# Patient Record
Sex: MALE | Race: WHITE | Employment: OTHER | URBAN - METROPOLITAN AREA
[De-identification: names, ages, dates, MRNs, and addresses within clinical notes are randomized per-mention and may not be internally consistent; named-entity substitution may affect disease eponyms.]

---

## 2017-02-06 ENCOUNTER — OFFICE VISIT (OUTPATIENT)
Dept: FAMILY MEDICINE CLINIC | Facility: CLINIC | Age: 62
End: 2017-02-06

## 2017-02-06 VITALS
HEIGHT: 70.5 IN | DIASTOLIC BLOOD PRESSURE: 68 MMHG | WEIGHT: 269 LBS | RESPIRATION RATE: 20 BRPM | HEART RATE: 68 BPM | SYSTOLIC BLOOD PRESSURE: 132 MMHG | BODY MASS INDEX: 38.08 KG/M2

## 2017-02-06 DIAGNOSIS — I27.20 PULMONARY HYPERTENSION (HCC): ICD-10-CM

## 2017-02-06 DIAGNOSIS — I77.810 DILATATION OF THORACIC AORTA (HCC): ICD-10-CM

## 2017-02-06 DIAGNOSIS — E11.9 TYPE 2 DIABETES MELLITUS WITHOUT COMPLICATION, WITHOUT LONG-TERM CURRENT USE OF INSULIN (HCC): Primary | ICD-10-CM

## 2017-02-06 DIAGNOSIS — I10 HYPERTENSION GOAL BP (BLOOD PRESSURE) < 130/80: ICD-10-CM

## 2017-02-06 DIAGNOSIS — E78.5 DYSLIPIDEMIA, GOAL LDL BELOW 100: ICD-10-CM

## 2017-02-06 PROCEDURE — 99214 OFFICE O/P EST MOD 30 MIN: CPT | Performed by: FAMILY MEDICINE

## 2017-02-06 NOTE — PROGRESS NOTES
HPI:   Gilmer Acevedo is a 62year old male who presents for recheck of his diabetes.     Patient presents with:  Diabetes    Colonoscopy,10 Years due on 10/13/2006  Annual Depression Screen due on 05/04/2017     Diabetic flowsheet (Last 6 entries)  DIABET 12/21/2016 07:54 AM   CREATSERUM 0.85 12/21/2016 07:54 AM         Wt Readings from Last 3 Encounters:  02/06/17 : 269 lb  11/04/16 : 272 lb  05/04/16 : 268 lb    BP Readings from Last 3 Encounters:  02/06/17 : 132/68  11/04/16 : 130/76  05/04/16 : 130/78 otherwise  SKIN: denies any unusual skin lesions or rashes  RESPIRATORY: denies shortness of breath with exertion  CARDIOVASCULAR: denies chest pain on exertion  GI: denies abdominal pain and denies heartburn  NEURO: denies headaches    EXAM:   /68 m cholesterol, Lipids are well controlled, no significant medication side effects noted.    PLAN: will continue present medications, reviewed diet, exercise and weight control, and labs as ordered        Mr. Brenda Geronimo already takes aspirin and has it on his med

## 2017-02-20 ENCOUNTER — HOSPITAL ENCOUNTER (OUTPATIENT)
Dept: CT IMAGING | Facility: HOSPITAL | Age: 62
Discharge: HOME | End: 2017-02-20
Attending: THORACIC SURGERY (CARDIOTHORACIC VASCULAR SURGERY)
Payer: COMMERCIAL

## 2017-02-20 DIAGNOSIS — I77.810 DILATED AORTIC ROOT (HCC): ICD-10-CM

## 2017-02-20 PROCEDURE — 71250 CT THORAX DX C-: CPT

## 2017-04-05 ENCOUNTER — PRIOR ORIGINAL RECORDS (OUTPATIENT)
Dept: OTHER | Age: 62
End: 2017-04-05

## 2017-06-22 ENCOUNTER — PRIOR ORIGINAL RECORDS (OUTPATIENT)
Dept: OTHER | Age: 62
End: 2017-06-22

## 2017-06-22 LAB
ALKALINE PHOSPHATATE(ALK PHOS): 66 IU/L
BILIRUBIN TOTAL: 1.1 MG/DL
BUN: 19 MG/DL
CALCIUM: 9.8 MG/DL
CHLORIDE: 104 MEQ/L
CHOLESTEROL, TOTAL: 108 MG/DL
CREATININE, SERUM: 0.79 MG/DL
GLUCOSE: 144 MG/DL
HDL CHOLESTEROL: 34 MG/DL
HEMOGLOBIN A1C: 5.5 %
LDL CHOLESTEROL: 52 MG/DL
POTASSIUM, SERUM: 4 MEQ/L
PROTEIN, TOTAL: 7.2 G/DL
SGOT (AST): 33 IU/L
SGPT (ALT): 55 IU/L
SODIUM: 141 MEQ/L
TRIGLYCERIDES: 111 MG/DL

## 2017-07-24 DIAGNOSIS — E78.5 DYSLIPIDEMIA, GOAL LDL BELOW 100: Primary | ICD-10-CM

## 2017-07-25 RX ORDER — ATORVASTATIN CALCIUM 20 MG/1
TABLET, FILM COATED ORAL
Qty: 90 TABLET | Refills: 1 | Status: SHIPPED | OUTPATIENT
Start: 2017-07-25 | End: 2018-01-16

## 2017-09-27 ENCOUNTER — HOSPITAL ENCOUNTER (OUTPATIENT)
Dept: CV DIAGNOSTICS | Age: 62
Discharge: HOME | End: 2017-09-27
Attending: INTERNAL MEDICINE

## 2017-09-27 ENCOUNTER — MYAURORA ACCOUNT LINK (OUTPATIENT)
Dept: OTHER | Age: 62
End: 2017-09-27

## 2017-09-27 DIAGNOSIS — I25.10 CORONARY ARTERIOSCLEROSIS: ICD-10-CM

## 2017-09-27 DIAGNOSIS — I71.2 THORACIC ASCENDING AORTIC ANEURYSM (HCC): ICD-10-CM

## 2017-10-02 ENCOUNTER — OFFICE VISIT (OUTPATIENT)
Dept: FAMILY MEDICINE CLINIC | Facility: CLINIC | Age: 62
End: 2017-10-02

## 2017-10-02 VITALS
HEIGHT: 70.75 IN | BODY MASS INDEX: 37.24 KG/M2 | DIASTOLIC BLOOD PRESSURE: 86 MMHG | SYSTOLIC BLOOD PRESSURE: 138 MMHG | HEART RATE: 80 BPM | RESPIRATION RATE: 16 BRPM | WEIGHT: 266 LBS

## 2017-10-02 DIAGNOSIS — E78.5 DYSLIPIDEMIA, GOAL LDL BELOW 100: ICD-10-CM

## 2017-10-02 DIAGNOSIS — E11.9 TYPE 2 DIABETES MELLITUS WITHOUT COMPLICATION, WITHOUT LONG-TERM CURRENT USE OF INSULIN (HCC): Primary | ICD-10-CM

## 2017-10-02 DIAGNOSIS — I10 HYPERTENSION GOAL BP (BLOOD PRESSURE) < 130/80: ICD-10-CM

## 2017-10-02 PROCEDURE — 99214 OFFICE O/P EST MOD 30 MIN: CPT | Performed by: FAMILY MEDICINE

## 2017-10-02 PROCEDURE — 83036 HEMOGLOBIN GLYCOSYLATED A1C: CPT | Performed by: FAMILY MEDICINE

## 2017-10-02 RX ORDER — AMLODIPINE BESYLATE 5 MG/1
5 TABLET ORAL DAILY
Qty: 90 TABLET | Refills: 3 | Status: SHIPPED | OUTPATIENT
Start: 2017-10-02 | End: 2019-01-09

## 2017-10-02 RX ORDER — VALSARTAN AND HYDROCHLOROTHIAZIDE 320; 25 MG/1; MG/1
1 TABLET, FILM COATED ORAL DAILY
Qty: 90 TABLET | Refills: 3 | Status: SHIPPED | OUTPATIENT
Start: 2017-10-02 | End: 2018-11-23

## 2017-10-02 NOTE — PROGRESS NOTES
HPI:   Fletcher Brady is a 62year old male who presents for recheck of his diabetes.   A1c 5.7%, BP up today, does not check at home, stressful day today  Patient presents with:  Diabetes  Testing: Pt due for colonoscopy    FIT Colorectal Screening due on (A) 10/02/2017 12:37 PM    (H) 04/05/2017 08:26 AM   HDL 34 (L) 04/05/2017 08:26 AM   LDL 52 04/05/2017 08:26 AM   CHOLEST 108 (L) 04/05/2017 08:26 AM   TRIG 111 04/05/2017 08:26 AM   BUN 19 04/05/2017 08:26 AM   CREATSERUM 0.79 04/05/2017 08:26 AM file prior to visit.          REVIEW OF SYSTEMS:   Review of Systems GENERAL HEALTH: feels well otherwise  SKIN: denies any unusual skin lesions or rashes  RESPIRATORY: denies shortness of breath with exertion  CARDIOVASCULAR: denies chest pain on exertion medications, reviewed diet, exercise and weight control, and labs as ordered     As for his cholesterol, Lipids are well controlled, no significant medication side effects noted.    PLAN: will continue present medications, reviewed diet, exercise and weight

## 2017-11-18 NOTE — TELEPHONE ENCOUNTER
From: Enrico Vivar  Sent: 11/18/2017 6:34 AM CST  Subject: Medication Renewal Request    Shaylee Jessicamilena. Baron would like a refill of the following medications:     Ketoconazole-Hydrocortisone 2 & 1 % (CREAM) Apply Externally Kit Marsha Goldberg, MD]   Patient Comment: Have plenty of cream left but is gloppy. Seems to work very well for skin dryness at this time of year.     Preferred pharmacy: [facility] - [facility] [address] [place] [address], [phone]

## 2017-11-20 ENCOUNTER — TELEPHONE (OUTPATIENT)
Dept: FAMILY MEDICINE CLINIC | Facility: CLINIC | Age: 62
End: 2017-11-20

## 2017-11-20 NOTE — TELEPHONE ENCOUNTER
Pharmacy is contacting office in regards to    Ketoconazole-Hydrocortisone 2 & 1 % (CREAM) Apply Externally Kit , needing clarification.

## 2017-11-20 NOTE — TELEPHONE ENCOUNTER
Called pharmacy and discussed medication it is not available in that configuration they can split it and have him mix them and apply 2 times daily that is what will be done at this point

## 2017-12-28 ENCOUNTER — PRIOR ORIGINAL RECORDS (OUTPATIENT)
Dept: OTHER | Age: 62
End: 2017-12-28

## 2018-01-16 DIAGNOSIS — E78.5 DYSLIPIDEMIA, GOAL LDL BELOW 100: ICD-10-CM

## 2018-01-16 RX ORDER — ATORVASTATIN CALCIUM 20 MG/1
TABLET, FILM COATED ORAL
Qty: 90 TABLET | Refills: 1 | Status: SHIPPED | OUTPATIENT
Start: 2018-01-16 | End: 2018-07-17

## 2018-03-21 ENCOUNTER — HOSPITAL ENCOUNTER (OUTPATIENT)
Dept: CT IMAGING | Age: 63
Discharge: HOME | End: 2018-03-21
Attending: THORACIC SURGERY (CARDIOTHORACIC VASCULAR SURGERY)
Payer: COMMERCIAL

## 2018-03-21 DIAGNOSIS — I71.2 ASCENDING AORTIC ANEURYSM (HCC): ICD-10-CM

## 2018-03-21 PROCEDURE — 71250 CT THORAX DX C-: CPT | Performed by: THORACIC SURGERY (CARDIOTHORACIC VASCULAR SURGERY)

## 2018-03-27 ENCOUNTER — OFFICE VISIT (OUTPATIENT)
Dept: FAMILY MEDICINE CLINIC | Facility: CLINIC | Age: 63
End: 2018-03-27

## 2018-03-27 VITALS
HEART RATE: 84 BPM | SYSTOLIC BLOOD PRESSURE: 138 MMHG | HEIGHT: 71 IN | DIASTOLIC BLOOD PRESSURE: 70 MMHG | TEMPERATURE: 99 F | BODY MASS INDEX: 35.98 KG/M2 | WEIGHT: 257 LBS | RESPIRATION RATE: 16 BRPM

## 2018-03-27 DIAGNOSIS — S16.1XXA STRAIN OF CERVICAL PORTION OF RIGHT TRAPEZIUS MUSCLE: Primary | ICD-10-CM

## 2018-03-27 PROCEDURE — 99213 OFFICE O/P EST LOW 20 MIN: CPT | Performed by: FAMILY MEDICINE

## 2018-03-27 RX ORDER — CYCLOBENZAPRINE HCL 10 MG
10 TABLET ORAL NIGHTLY PRN
Qty: 30 TABLET | Refills: 0 | Status: SHIPPED | OUTPATIENT
Start: 2018-03-27 | End: 2018-04-06

## 2018-03-28 NOTE — PROGRESS NOTES
Patricia Segura is a 65year old male. HPI:   Patient presents with right sided neck pain x 3 months. No injury that he can recall. Pain with neck extension and rotation and side flexion. No pain in to arms, no numbness/tingling of arms.   Good streng

## 2018-04-02 ENCOUNTER — PRIOR ORIGINAL RECORDS (OUTPATIENT)
Dept: OTHER | Age: 63
End: 2018-04-02

## 2018-04-02 ENCOUNTER — TELEPHONE (OUTPATIENT)
Dept: FAMILY MEDICINE CLINIC | Facility: CLINIC | Age: 63
End: 2018-04-02

## 2018-04-02 NOTE — TELEPHONE ENCOUNTER
Called and talked to patient and told him that yes Dr Noe Elliott will want to do the physical so he should still come in for the physical

## 2018-04-02 NOTE — TELEPHONE ENCOUNTER
Patient is scheduled for a diabetic follow up with Dr. Dori Olivarez 04/06/18 and is not sure if he still needs to be seen.  Patient had his blood work done today and saw Dr. Neli Curtis 03/27/18 and she checked his BP so he is not sure if he still needs to come in

## 2018-04-06 ENCOUNTER — OFFICE VISIT (OUTPATIENT)
Dept: FAMILY MEDICINE CLINIC | Facility: CLINIC | Age: 63
End: 2018-04-06

## 2018-04-06 VITALS
HEART RATE: 78 BPM | TEMPERATURE: 98 F | SYSTOLIC BLOOD PRESSURE: 130 MMHG | DIASTOLIC BLOOD PRESSURE: 82 MMHG | HEIGHT: 71 IN | RESPIRATION RATE: 14 BRPM | WEIGHT: 257 LBS | BODY MASS INDEX: 35.98 KG/M2

## 2018-04-06 DIAGNOSIS — E78.5 DYSLIPIDEMIA, GOAL LDL BELOW 100: ICD-10-CM

## 2018-04-06 DIAGNOSIS — I77.810 DILATATION OF THORACIC AORTA (HCC): ICD-10-CM

## 2018-04-06 DIAGNOSIS — I10 HYPERTENSION GOAL BP (BLOOD PRESSURE) < 130/80: ICD-10-CM

## 2018-04-06 DIAGNOSIS — E11.9 TYPE 2 DIABETES MELLITUS WITHOUT COMPLICATION, WITHOUT LONG-TERM CURRENT USE OF INSULIN (HCC): Primary | ICD-10-CM

## 2018-04-06 DIAGNOSIS — I27.20 PULMONARY HYPERTENSION (HCC): ICD-10-CM

## 2018-04-06 PROCEDURE — 99214 OFFICE O/P EST MOD 30 MIN: CPT | Performed by: FAMILY MEDICINE

## 2018-04-06 NOTE — PROGRESS NOTES
HPI:   Patricia Segura is a 65year old male who presents for recheck of his diabetes. A1c 5.5% and TLC including 8K steps daily.   Patient presents with:  Diabetes: 6 month f/u   Colonoscopy Screening: pt is due     Pneumococcal PPSV23 Medium Risk Adult(1 - - - - - - -   Lab Comment - - - - - - -          Diabetic [facility]     Lab Results  Component Value Date/Time   A1C 5.5 04/02/2018 08:27 AM    (H) 04/02/2018 08:27 AM   HDL 31 (L) 04/02/2018 08:27 AM   LDL 57 04/02/2018 08:27 AM   WILI Betamethasone Dipropionate (DIPROSONE) 0.05 % Apply Externally Ointment Apply 1 Application topically 2 (two) times daily. aspirin 81 MG Oral Chew Tab Chew 81 mg by mouth daily. Multiple Vitamins-Minerals (MULTIVITAMIN OR) Take  by mouth.      No curr breath sounds normal. No respiratory distress. He has no wheezes. Abdominal: Soft. Bowel sounds are normal. There is no tenderness. Musculoskeletal:        Right foot: There is no deformity. Left foot: There is no deformity.    Feet: diabetic zhanna and Cardiac Medications          Valsartan-Hydrochlorothiazide 320-25 MG Oral Tab    AmLODIPine Besylate 5 MG Oral Tab    Metoprolol Succinate ER (TOPROL XL) 25 MG Oral Tablet 24 Hr                 Dyslipidemia, goal LDL below 100    Overview     Atorvasta

## 2018-04-06 NOTE — ASSESSMENT & PLAN NOTE
Stable, Continue present management.     Blood Pressure and Cardiac Medications          Valsartan-Hydrochlorothiazide 320-25 MG Oral Tab    AmLODIPine Besylate 5 MG Oral Tab    Metoprolol Succinate ER (TOPROL XL) 25 MG Oral Tablet 24 Hr

## 2018-04-06 NOTE — ASSESSMENT & PLAN NOTE
As for his Diabetes, it is well controlled, no significant medication side effects noted. Recommendations are: continue present meds, lose weight by increased dietary compliance and exercise and will check labs as ordered.       Lab Results  Component V

## 2018-07-17 DIAGNOSIS — E78.5 DYSLIPIDEMIA, GOAL LDL BELOW 100: ICD-10-CM

## 2018-07-18 RX ORDER — ATORVASTATIN CALCIUM 20 MG/1
TABLET, FILM COATED ORAL
Qty: 90 TABLET | Refills: 1 | Status: SHIPPED | OUTPATIENT
Start: 2018-07-18 | End: 2019-01-18

## 2018-09-26 ENCOUNTER — MYAURORA ACCOUNT LINK (OUTPATIENT)
Dept: OTHER | Age: 63
End: 2018-09-26

## 2018-09-26 ENCOUNTER — HOSPITAL ENCOUNTER (OUTPATIENT)
Dept: CV DIAGNOSTICS | Facility: HOSPITAL | Age: 63
Discharge: HOME | End: 2018-09-26
Attending: INTERNAL MEDICINE

## 2018-09-26 DIAGNOSIS — I71.2 THORACIC AORTIC ANEURYSM WITHOUT RUPTURE (HCC): ICD-10-CM

## 2018-09-26 DIAGNOSIS — I25.10 CORONARY ARTERIOSCLEROSIS: ICD-10-CM

## 2018-11-14 ENCOUNTER — OFFICE VISIT (OUTPATIENT)
Dept: FAMILY MEDICINE CLINIC | Facility: CLINIC | Age: 63
End: 2018-11-14
Payer: COMMERCIAL

## 2018-11-14 VITALS
HEART RATE: 70 BPM | BODY MASS INDEX: 37.1 KG/M2 | SYSTOLIC BLOOD PRESSURE: 130 MMHG | DIASTOLIC BLOOD PRESSURE: 80 MMHG | TEMPERATURE: 98 F | WEIGHT: 265 LBS | HEIGHT: 71 IN

## 2018-11-14 DIAGNOSIS — E11.9 TYPE 2 DIABETES MELLITUS WITHOUT COMPLICATION, WITHOUT LONG-TERM CURRENT USE OF INSULIN (HCC): ICD-10-CM

## 2018-11-14 DIAGNOSIS — E78.5 DYSLIPIDEMIA, GOAL LDL BELOW 100: ICD-10-CM

## 2018-11-14 DIAGNOSIS — I10 HYPERTENSION GOAL BP (BLOOD PRESSURE) < 130/80: ICD-10-CM

## 2018-11-14 DIAGNOSIS — Z00.00 ANNUAL PHYSICAL EXAM: Primary | ICD-10-CM

## 2018-11-14 PROCEDURE — 99396 PREV VISIT EST AGE 40-64: CPT | Performed by: FAMILY MEDICINE

## 2018-11-14 PROCEDURE — 90471 IMMUNIZATION ADMIN: CPT | Performed by: FAMILY MEDICINE

## 2018-11-14 PROCEDURE — 90715 TDAP VACCINE 7 YRS/> IM: CPT | Performed by: FAMILY MEDICINE

## 2018-11-14 RX ORDER — SILDENAFIL CITRATE 20 MG/1
TABLET ORAL DAILY PRN
Qty: 10 TABLET | Refills: 3 | Status: SHIPPED | OUTPATIENT
Start: 2018-11-14 | End: 2019-02-11

## 2018-11-14 NOTE — PROGRESS NOTES
Nikolay Wills is a 59year old male who presents for a complete physical exam.   HPI:   Patient presents with:  Physical  Imm/Inj: Has questions on Tdap vaccine  Orders Call: Due for Colonoscopy       His last annual assessment has been over 1 year: Leigh Ann Barbara 73 04/02/2018 08:27 AM       Lab Results   Component Value Date/Time    A1C 5.5 04/02/2018 08:27 AM              Current Outpatient Medications on File Prior to Visit:  ATORVASTATIN 20 MG Oral Tab TAKE 1 TABLET BY MOUTH DAILY   amoxicillin 500 MG O tobacco. He reports that he drinks about 4.2 oz of alcohol per week. He reports that he does not use drugs. Exercise: three times per week, walking.   Diet: watches fats closely and watches sugar closely    REVIEW OF SYSTEMS:   A comprehensive 14 point re bruit not present. Pulmonary/Chest: Effort normal and breath sounds normal. No respiratory distress. He has no wheezes. He has no rales. Abdominal: Soft. Normal appearance and bowel sounds are normal. He exhibits no distension and no mass.  There is no h 04/19/2021  Influenza Vaccine Completed    Pt info given for: exercise, low fat diet, The patient indicates understanding of these issues and agrees to the plan. The patient is asked to return for CPX in 1 years.     Assessment:  Problem List Items Address

## 2018-11-15 ENCOUNTER — TELEPHONE (OUTPATIENT)
Dept: FAMILY MEDICINE CLINIC | Facility: CLINIC | Age: 63
End: 2018-11-15

## 2018-11-15 NOTE — ASSESSMENT & PLAN NOTE
As for his Diabetes, it is well controlled, no significant medication side effects noted. Recommendations are: continue present meds, lose weight by increased dietary compliance and exercise and will check labs as ordered.     Lab Results   Component Va

## 2018-11-15 NOTE — TELEPHONE ENCOUNTER
He does have a dilation of the thoracic aorta currently at 5 cm with normal being less than 4 cm. I think the bigger the aneurysm the more the concern about this.   He does have pulmonary artery elevated pressure and the sildenafil has been approved for lo

## 2018-11-15 NOTE — TELEPHONE ENCOUNTER
Talked to patient and he has a thoracic aneurysm and wife is concerned because studies suggested he should not take this medication so he wants Dr Tarik Patel to confirm that it is alright

## 2018-11-23 DIAGNOSIS — I10 HYPERTENSION GOAL BP (BLOOD PRESSURE) < 130/80: ICD-10-CM

## 2018-11-24 RX ORDER — VALSARTAN AND HYDROCHLOROTHIAZIDE 320; 25 MG/1; MG/1
TABLET, FILM COATED ORAL
Qty: 90 TABLET | Refills: 1 | Status: SHIPPED | OUTPATIENT
Start: 2018-11-24 | End: 2019-03-16

## 2018-12-27 ENCOUNTER — PRIOR ORIGINAL RECORDS (OUTPATIENT)
Dept: OTHER | Age: 63
End: 2018-12-27

## 2018-12-27 ENCOUNTER — MYAURORA ACCOUNT LINK (OUTPATIENT)
Dept: OTHER | Age: 63
End: 2018-12-27

## 2019-01-09 ENCOUNTER — TELEPHONE (OUTPATIENT)
Dept: FAMILY MEDICINE CLINIC | Facility: CLINIC | Age: 64
End: 2019-01-09

## 2019-01-09 DIAGNOSIS — I10 HYPERTENSION GOAL BP (BLOOD PRESSURE) < 130/80: ICD-10-CM

## 2019-01-09 RX ORDER — AMLODIPINE BESYLATE 5 MG/1
5 TABLET ORAL DAILY
Qty: 90 TABLET | Refills: 0 | Status: SHIPPED | OUTPATIENT
Start: 2019-01-09 | End: 2019-02-11

## 2019-01-09 NOTE — TELEPHONE ENCOUNTER
Refill request from [facility] for refill on Amlodipine. Verified with Pt that [facility] is new mailorder [facility].   LOV 11/14/18, last labs 4/2/18  Refill done per protocol

## 2019-01-10 LAB
ALBUMIN: 4.5 G/DL
ALKALINE PHOSPHATATE(ALK PHOS): 68 IU/L
BILIRUBIN TOTAL: 0.9 MG/DL
BUN: 18 MG/DL
CALCIUM: 9.2 MG/DL
CHLORIDE: 106 MEQ/L
CHOLESTEROL, TOTAL: 103 MG/DL
CREATININE, SERUM: 0.84 MG/DL
GLOBULIN: 2.2 G/DL
GLUCOSE: 114 MG/DL
HDL CHOLESTEROL: 31 MG/DL
HEMOGLOBIN A1C: 5.5 %
LDL CHOLESTEROL: 57 MG/DL
POTASSIUM, SERUM: 3.5 MEQ/L
PROTEIN, TOTAL: 6.7 G/DL
SGOT (AST): 27 IU/L
SGPT (ALT): 54 IU/L
SODIUM: 141 MEQ/L
TRIGLYCERIDES: 71 MG/DL

## 2019-01-18 ENCOUNTER — TELEPHONE (OUTPATIENT)
Dept: FAMILY MEDICINE CLINIC | Facility: CLINIC | Age: 64
End: 2019-01-18

## 2019-01-18 DIAGNOSIS — E78.5 DYSLIPIDEMIA, GOAL LDL BELOW 100: ICD-10-CM

## 2019-01-18 RX ORDER — ATORVASTATIN CALCIUM 20 MG/1
20 TABLET, FILM COATED ORAL
Qty: 90 TABLET | Refills: 0 | Status: SHIPPED | OUTPATIENT
Start: 2019-01-18 | End: 2019-02-11

## 2019-01-18 NOTE — TELEPHONE ENCOUNTER
Faxed request from [facility] for refill on Atorvastatin.   LOV 11/4/18, last labs 4/2/18  Med refilled per protocol

## 2019-02-06 ENCOUNTER — TELEPHONE (OUTPATIENT)
Dept: FAMILY MEDICINE CLINIC | Facility: CLINIC | Age: 64
End: 2019-02-06

## 2019-02-06 NOTE — TELEPHONE ENCOUNTER
Received fax from [address] [place] [address] for pt (Diabetic Eye Exam) placed in triage to be abstracted

## 2019-02-11 ENCOUNTER — OFFICE VISIT (OUTPATIENT)
Dept: FAMILY MEDICINE CLINIC | Facility: CLINIC | Age: 64
End: 2019-02-11
Payer: COMMERCIAL

## 2019-02-11 VITALS
SYSTOLIC BLOOD PRESSURE: 130 MMHG | WEIGHT: 255 LBS | HEIGHT: 71 IN | HEART RATE: 84 BPM | DIASTOLIC BLOOD PRESSURE: 80 MMHG | RESPIRATION RATE: 12 BRPM | TEMPERATURE: 97 F | BODY MASS INDEX: 35.7 KG/M2

## 2019-02-11 DIAGNOSIS — R73.9 HYPERGLYCEMIA: ICD-10-CM

## 2019-02-11 DIAGNOSIS — E78.5 DYSLIPIDEMIA, GOAL LDL BELOW 100: ICD-10-CM

## 2019-02-11 DIAGNOSIS — Z11.59 ENCOUNTER FOR HEPATITIS C SCREENING TEST FOR LOW RISK PATIENT: ICD-10-CM

## 2019-02-11 DIAGNOSIS — I10 HYPERTENSION GOAL BP (BLOOD PRESSURE) < 130/80: ICD-10-CM

## 2019-02-11 DIAGNOSIS — Z12.5 SCREENING FOR PROSTATE CANCER: ICD-10-CM

## 2019-02-11 DIAGNOSIS — E11.9 TYPE 2 DIABETES MELLITUS WITHOUT COMPLICATION, WITHOUT LONG-TERM CURRENT USE OF INSULIN (HCC): Primary | ICD-10-CM

## 2019-02-11 DIAGNOSIS — Z00.00 LABORATORY EXAMINATION ORDERED AS PART OF A ROUTINE GENERAL MEDICAL EXAMINATION: ICD-10-CM

## 2019-02-11 PROCEDURE — 99214 OFFICE O/P EST MOD 30 MIN: CPT | Performed by: FAMILY MEDICINE

## 2019-02-11 RX ORDER — AMLODIPINE BESYLATE 5 MG/1
5 TABLET ORAL DAILY
Qty: 90 TABLET | Refills: 3 | Status: SHIPPED | OUTPATIENT
Start: 2019-02-11 | End: 2020-01-02

## 2019-02-11 RX ORDER — ATORVASTATIN CALCIUM 20 MG/1
20 TABLET, FILM COATED ORAL
Qty: 90 TABLET | Refills: 3 | Status: SHIPPED | OUTPATIENT
Start: 2019-02-11

## 2019-02-11 NOTE — PROGRESS NOTES
HPI:   Patient presents with:  Diabetes  Throat Problem: feels like something in there    Arianna Domignuez is a 59year old male who presents for recheck of his diabetes.   Subjective    Doing well overall, excellent sugar control with weight loss  Lab Resul No murmur heard. Pulses:       Dorsalis pedis pulses are 2+ on the right side, and 2+ on the left side. Posterior tibial pulses are 2+ on the right side, and 2+ on the left side. Edema not present.   Pulmonary/Chest: Effort normal and breath so Relevant Medications    atorvastatin 20 MG Oral Tab       Endocrine    Type 2 diabetes mellitus ([facility] [address] - Primary    Overview     Dx 12/2013 with  with Dr Amilcar Hernandez, TLC and diet controlled.  A1c 5.8% diet controlled but up to 8.0% 11/4/2016

## 2019-02-12 NOTE — ASSESSMENT & PLAN NOTE
Stable, Continue present management.     Blood Pressure and Cardiac Medications          AmLODIPine Besylate 5 MG Oral Tab    VALSARTAN-HYDROCHLOROTHIAZIDE 320-25 MG Oral Tab    Metoprolol Succinate ER (TOPROL XL) 25 MG Oral Tablet 24 Hr

## 2019-02-19 LAB
ALBUMIN/GLOBULIN RATIO: 1.8 (CALC) (ref 1–2.5)
ALBUMIN: 4.5 G/DL (ref 3.6–5.1)
ALKALINE PHOSPHATASE: 72 U/L (ref 40–115)
ALT: 82 U/L (ref 9–46)
AST: 50 U/L (ref 10–35)
BILIRUBIN, TOTAL: 1.7 MG/DL (ref 0.2–1.2)
BUN: 15 MG/DL (ref 7–25)
CALCIUM: 9.6 MG/DL (ref 8.6–10.3)
CARBON DIOXIDE: 27 MMOL/L (ref 20–32)
CHLORIDE: 103 MMOL/L (ref 98–110)
CHOL/HDLC RATIO: 4 (CALC)
CHOLESTEROL, TOTAL: 103 MG/DL
CREATININE: 0.79 MG/DL (ref 0.7–1.25)
EGFR IF AFRICN AM: 112 ML/MIN/1.73M2
EGFR IF NONAFRICN AM: 96 ML/MIN/1.73M2
GLOBULIN: 2.5 G/DL (CALC) (ref 1.9–3.7)
GLUCOSE: 304 MG/DL (ref 65–99)
HDL CHOLESTEROL: 26 MG/DL
HEMOGLOBIN A1C: 9.3 % OF TOTAL HGB
LDL-CHOLESTEROL: 37 MG/DL (CALC)
NON-HDL CHOLESTEROL: 77 MG/DL (CALC)
POTASSIUM: 3.8 MMOL/L (ref 3.5–5.3)
PROTEIN, TOTAL: 7 G/DL (ref 6.1–8.1)
PSA, TOTAL: 0.9 NG/ML
SIGNAL TO CUT-OFF: 0.01
SODIUM: 140 MMOL/L (ref 135–146)
TRIGLYCERIDES: 382 MG/DL
TSH W/REFLEX TO FT4: 1.57 MIU/L (ref 0.4–4.5)

## 2019-02-22 ENCOUNTER — TELEPHONE (OUTPATIENT)
Dept: FAMILY MEDICINE CLINIC | Facility: CLINIC | Age: 64
End: 2019-02-22

## 2019-02-22 ENCOUNTER — OFFICE VISIT (OUTPATIENT)
Dept: FAMILY MEDICINE CLINIC | Facility: CLINIC | Age: 64
End: 2019-02-22
Payer: COMMERCIAL

## 2019-02-22 VITALS
SYSTOLIC BLOOD PRESSURE: 120 MMHG | WEIGHT: 255 LBS | RESPIRATION RATE: 16 BRPM | DIASTOLIC BLOOD PRESSURE: 78 MMHG | BODY MASS INDEX: 35.7 KG/M2 | HEART RATE: 88 BPM | TEMPERATURE: 98 F | HEIGHT: 71 IN

## 2019-02-22 DIAGNOSIS — E11.9 TYPE 2 DIABETES MELLITUS WITHOUT COMPLICATION, WITHOUT LONG-TERM CURRENT USE OF INSULIN (HCC): Primary | ICD-10-CM

## 2019-02-22 PROCEDURE — 99214 OFFICE O/P EST MOD 30 MIN: CPT | Performed by: FAMILY MEDICINE

## 2019-02-22 RX ORDER — LANCETS 30 GAUGE
EACH MISCELLANEOUS
Qty: 300 EACH | Refills: 3 | Status: SHIPPED | OUTPATIENT
Start: 2019-02-22

## 2019-02-22 RX ORDER — BLOOD SUGAR DIAGNOSTIC
STRIP MISCELLANEOUS
Qty: 300 STRIP | Refills: 3 | Status: SHIPPED | OUTPATIENT
Start: 2019-02-22 | End: 2020-02-22

## 2019-02-22 RX ORDER — BLOOD-GLUCOSE METER
1 EACH MISCELLANEOUS 2 TIMES DAILY
Qty: 1 KIT | Refills: 0 | COMMUNITY
Start: 2019-02-22 | End: 2020-02-22

## 2019-02-22 RX ORDER — PEN NEEDLE, DIABETIC 30 GX3/16"
1 NEEDLE, DISPOSABLE MISCELLANEOUS
Qty: 100 EACH | Refills: 1 | Status: SHIPPED | OUTPATIENT
Start: 2019-02-22

## 2019-02-22 NOTE — TELEPHONE ENCOUNTER
[facility] [place] to D/C the Rx due to no insurance coverage.    Pt will destroy Rx

## 2019-02-22 NOTE — PROGRESS NOTES
HPI:   Patient presents with:  Diabetes    Jamee Douglas is a 59year old male who presents for recheck of his diabetes. Subjective    Worse numbers over holidays. Getting ready to move.  gresat control after 203 dx  Lab Results   Component Value Date right side, and 2+ on the left side. Edema not present. Pulmonary/Chest: Effort normal and breath sounds normal. No respiratory distress. He has no wheezes. Abdominal: Soft. Bowel sounds are normal. There is no tenderness.    Neurological: He is alert

## 2019-02-22 NOTE — TELEPHONE ENCOUNTER
Interesting, I picked this because epic suggested that based on his insurance plan. I gave him a 6-week sample that he can continue.   He can try this for now, please contact him to let him know that it may not be covered, but have him check back either th

## 2019-02-22 NOTE — TELEPHONE ENCOUNTER
Called patient on cell phone an left a detailed message regarding no coverage for the Ozempic and to send Dr Luis Lion a MyChart message to see how he is doing in about one month,. Dr Luis Lion will issue another RX for a different injectable at that time.

## 2019-02-22 NOTE — TELEPHONE ENCOUNTER
[facility] in [place] [address], stated Mercedez Francisco is not covered by insurance and patient would pay entire cost out-of-pocket  OR  If this is the only med he can take then a PA would be needed  Do you want to change?

## 2019-02-28 VITALS
BODY MASS INDEX: 34.81 KG/M2 | WEIGHT: 257 LBS | HEIGHT: 72 IN | HEART RATE: 100 BPM | SYSTOLIC BLOOD PRESSURE: 146 MMHG | DIASTOLIC BLOOD PRESSURE: 82 MMHG

## 2019-02-28 VITALS
SYSTOLIC BLOOD PRESSURE: 138 MMHG | DIASTOLIC BLOOD PRESSURE: 82 MMHG | HEART RATE: 50 BPM | HEIGHT: 72 IN | WEIGHT: 266 LBS | BODY MASS INDEX: 36.03 KG/M2

## 2019-02-28 VITALS
HEIGHT: 72 IN | WEIGHT: 263 LBS | DIASTOLIC BLOOD PRESSURE: 55 MMHG | HEART RATE: 84 BPM | SYSTOLIC BLOOD PRESSURE: 124 MMHG | BODY MASS INDEX: 35.62 KG/M2

## 2019-03-08 ENCOUNTER — HOSPITAL ENCOUNTER (OUTPATIENT)
Dept: CT IMAGING | Age: 64
Discharge: HOME | End: 2019-03-08
Attending: THORACIC SURGERY (CARDIOTHORACIC VASCULAR SURGERY)
Payer: COMMERCIAL

## 2019-03-08 DIAGNOSIS — I71.2 ASCENDING AORTIC ANEURYSM (HCC): ICD-10-CM

## 2019-03-08 PROCEDURE — 71250 CT THORAX DX C-: CPT | Performed by: THORACIC SURGERY (CARDIOTHORACIC VASCULAR SURGERY)

## 2019-03-16 DIAGNOSIS — E11.9 TYPE 2 DIABETES MELLITUS WITHOUT COMPLICATION, WITHOUT LONG-TERM CURRENT USE OF INSULIN (HCC): ICD-10-CM

## 2019-03-16 DIAGNOSIS — I10 HYPERTENSION GOAL BP (BLOOD PRESSURE) < 130/80: ICD-10-CM

## 2019-03-18 RX ORDER — VALSARTAN AND HYDROCHLOROTHIAZIDE 320; 25 MG/1; MG/1
TABLET, FILM COATED ORAL
Qty: 90 TABLET | Refills: 1 | Status: SHIPPED | OUTPATIENT
Start: 2019-03-18 | End: 2019-09-25

## 2019-03-18 NOTE — TELEPHONE ENCOUNTER
Medication refilled per protocol.      Requested Prescriptions     Signed Prescriptions Disp Refills   • VALSARTAN-HYDROCHLOROTHIAZIDE 320-25 MG Oral Tab 90 tablet 1     Sig: TAKE ONE TABLET BY MOUTH  DAILY     Authorizing Provider: Willie Preston

## 2019-03-20 ENCOUNTER — TELEPHONE (OUTPATIENT)
Dept: FAMILY MEDICINE CLINIC | Facility: CLINIC | Age: 64
End: 2019-03-20

## 2019-03-20 NOTE — TELEPHONE ENCOUNTER
Received a fax from [employer] [place] rejected for regular fill, now needs a prior authorization. Paperwork in triage. LM for pt explaining the PA process and to call back with questions.

## 2019-03-20 NOTE — TELEPHONE ENCOUNTER
LOV 2/22/19. Future Appointments   Date Time Provider Anita Varghese   5/8/2019 11:45 AM Yvonne Chadwick MD [facility]     Last A1C was 9.3 on 2/18/19. Routed to Dr nAjel Castro.

## 2019-03-26 NOTE — TELEPHONE ENCOUNTER
Initiated PA for Ozempic 0.5 mg injection by accessing [facility] form on CMM. Entered pertinent info, pt diagnosis E11.9 and answered applicable questions.   [facility]

## 2019-03-26 NOTE — TELEPHONE ENCOUNTER
Received response from [facility] that Ozempic 0,5 mg injection is a covered drug on the pt's drug plan  Notified [facility] to fill pt's prescription

## 2019-04-01 ENCOUNTER — TELEPHONE (OUTPATIENT)
Dept: FAMILY MEDICINE CLINIC | Facility: CLINIC | Age: 64
End: 2019-04-01

## 2019-04-01 NOTE — TELEPHONE ENCOUNTER
Patient called requesting to speak with nurse, has rash on penis wants to know if can get a  cream or antibiotic sent to pharmacy, is leaving out of town on Thursday.

## 2019-04-01 NOTE — TELEPHONE ENCOUNTER
Called patient he has rash to penis for 4-5 weeks not getting worse or better red bumpy denies pain to area unable to come in today can come in after 2 pm on Wednesday.  I will ask Dr Joy Read about this

## 2019-04-03 ENCOUNTER — OFFICE VISIT (OUTPATIENT)
Dept: FAMILY MEDICINE CLINIC | Facility: CLINIC | Age: 64
End: 2019-04-03
Payer: COMMERCIAL

## 2019-04-03 VITALS
DIASTOLIC BLOOD PRESSURE: 76 MMHG | HEART RATE: 68 BPM | WEIGHT: 245 LBS | BODY MASS INDEX: 34.3 KG/M2 | HEIGHT: 70.75 IN | SYSTOLIC BLOOD PRESSURE: 124 MMHG | TEMPERATURE: 99 F

## 2019-04-03 DIAGNOSIS — L30.9 ECZEMA, UNSPECIFIED TYPE: Primary | ICD-10-CM

## 2019-04-03 PROCEDURE — 99213 OFFICE O/P EST LOW 20 MIN: CPT | Performed by: FAMILY MEDICINE

## 2019-04-03 NOTE — PROGRESS NOTES
Patient presents with:  Rash: x 3 weeks       Subjective   HPI:   This is a 59year old male coming in for lesion on tip of penis, irritated and dry ongoing for several weeks and not responding to simple over-the-counter measures    HPI   See reviewed tab

## 2019-04-17 ENCOUNTER — TELEPHONE (OUTPATIENT)
Dept: FAMILY MEDICINE CLINIC | Facility: CLINIC | Age: 64
End: 2019-04-17

## 2019-04-17 NOTE — TELEPHONE ENCOUNTER
Patient has injured his elbow and is not sure if he should see an orthopaedic or Dr. Sarabjit Rubio. Patient sent a [employer] message last week and Dr. Sarabjit Rubio gave some advice on icing and ibuprofen which patient has been doing but there is no relief.  Please advise larissa

## 2019-04-17 NOTE — TELEPHONE ENCOUNTER
Pt c/o elbow pain x 2 weeks- Pt currently in [place] and not back till 5/2/19. Offered appt for 5/3/19 but Pt unable to come in. Pt already has appt 5/8/19- Pt states he will just keep 5/8/19 appt and discuss elbow pain at visit.

## 2019-04-19 RX ORDER — OMEPRAZOLE 20 MG/1
CAPSULE, DELAYED RELEASE ORAL
COMMUNITY
Start: 2017-12-28

## 2019-04-19 RX ORDER — ATORVASTATIN CALCIUM 20 MG/1
1 TABLET, FILM COATED ORAL DAILY
COMMUNITY
Start: 2016-02-03 | End: 2019-10-03 | Stop reason: SDUPTHER

## 2019-04-19 RX ORDER — DIPHENOXYLATE HYDROCHLORIDE AND ATROPINE SULFATE 2.5; .025 MG/1; MG/1
1 TABLET ORAL DAILY
COMMUNITY
Start: 2009-06-04

## 2019-04-19 RX ORDER — METOPROLOL SUCCINATE 25 MG/1
1 TABLET, EXTENDED RELEASE ORAL AT BEDTIME
COMMUNITY
Start: 2019-02-12 | End: 2019-09-05 | Stop reason: SDUPTHER

## 2019-04-19 RX ORDER — AMLODIPINE BESYLATE 10 MG/1
0.5 TABLET ORAL DAILY
COMMUNITY
Start: 2014-01-03 | End: 2019-10-03 | Stop reason: SDUPTHER

## 2019-04-19 RX ORDER — VALSARTAN AND HYDROCHLOROTHIAZIDE 320; 25 MG/1; MG/1
1 TABLET, FILM COATED ORAL DAILY
COMMUNITY
Start: 2015-05-28 | End: 2019-10-03 | Stop reason: SDUPTHER

## 2019-05-08 ENCOUNTER — OFFICE VISIT (OUTPATIENT)
Dept: FAMILY MEDICINE CLINIC | Facility: CLINIC | Age: 64
End: 2019-05-08
Payer: COMMERCIAL

## 2019-05-08 VITALS
HEART RATE: 72 BPM | RESPIRATION RATE: 12 BRPM | SYSTOLIC BLOOD PRESSURE: 120 MMHG | WEIGHT: 246 LBS | TEMPERATURE: 98 F | DIASTOLIC BLOOD PRESSURE: 70 MMHG | BODY MASS INDEX: 34.82 KG/M2 | HEIGHT: 70.5 IN

## 2019-05-08 DIAGNOSIS — M19.021 ARTHRITIS OF RIGHT ELBOW: ICD-10-CM

## 2019-05-08 DIAGNOSIS — E11.9 TYPE 2 DIABETES MELLITUS WITHOUT COMPLICATION, WITHOUT LONG-TERM CURRENT USE OF INSULIN (HCC): ICD-10-CM

## 2019-05-08 DIAGNOSIS — I10 ESSENTIAL HYPERTENSION: Primary | ICD-10-CM

## 2019-05-08 DIAGNOSIS — E78.5 DYSLIPIDEMIA, GOAL LDL BELOW 100: ICD-10-CM

## 2019-05-08 PROCEDURE — 83036 HEMOGLOBIN GLYCOSYLATED A1C: CPT | Performed by: FAMILY MEDICINE

## 2019-05-08 PROCEDURE — 99214 OFFICE O/P EST MOD 30 MIN: CPT | Performed by: FAMILY MEDICINE

## 2019-05-08 NOTE — PROGRESS NOTES
HPI:   Patient presents with:  Diabetes: f/u   Elbow Pain    Ena Grant is a 59year old male who presents for recheck of his diabetes. Subjective    Doing better, sugars better. Elbow hurts after golfing, Ozempic hleping a lot,   Seen in [place].   L vitals reviewed. Constitutional: He is oriented to person, place, and time. He appears well-developed and well-nourished. No distress. HENT:   Head: Normocephalic. Neck: Normal range of motion.    Cardiovascular: Normal rate, regular rhythm, S1 normal medication side effects noted. PLAN: will continue present medications, reviewed diet, exercise and weight control, and labs as ordered     As for his cholesterol, Lipids are well controlled, no significant medication side effects noted.    PLAN: will con

## 2019-07-18 ENCOUNTER — TELEPHONE (OUTPATIENT)
Dept: FAMILY MEDICINE CLINIC | Facility: CLINIC | Age: 64
End: 2019-07-18

## 2019-07-18 DIAGNOSIS — E11.9 TYPE 2 DIABETES MELLITUS WITHOUT COMPLICATION, WITHOUT LONG-TERM CURRENT USE OF INSULIN (HCC): ICD-10-CM

## 2019-07-18 RX ORDER — SEMAGLUTIDE 1.34 MG/ML
INJECTION, SOLUTION SUBCUTANEOUS
Qty: 1.5 ML | Refills: 0 | Status: SHIPPED | OUTPATIENT
Start: 2019-07-18 | End: 2019-07-18 | Stop reason: CLARIF

## 2019-07-18 NOTE — TELEPHONE ENCOUNTER
Called [facility] and they do not have the script from March that was sent to [facility] but was not transferred so I sent in refill for patient notified wife of refill

## 2019-09-05 ENCOUNTER — TELEPHONE (OUTPATIENT)
Dept: CARDIOLOGY | Age: 64
End: 2019-09-05

## 2019-09-06 RX ORDER — METOPROLOL SUCCINATE 25 MG/1
TABLET, EXTENDED RELEASE ORAL
Qty: 90 TABLET | Refills: 0 | Status: SHIPPED | OUTPATIENT
Start: 2019-09-06 | End: 2019-10-03 | Stop reason: SDUPTHER

## 2019-09-11 LAB
ALBUMIN/GLOBULIN RATIO: 1.8 (CALC) (ref 1–2.5)
ALBUMIN: 4.5 G/DL (ref 3.6–5.1)
ALKALINE PHOSPHATASE: 59 U/L (ref 40–115)
ALT: 57 U/L (ref 9–46)
AST: 32 U/L (ref 10–35)
BILIRUBIN, TOTAL: 1.5 MG/DL (ref 0.2–1.2)
BUN: 19 MG/DL (ref 7–25)
CALCIUM: 9.7 MG/DL (ref 8.6–10.3)
CARBON DIOXIDE: 28 MMOL/L (ref 20–32)
CHLORIDE: 105 MMOL/L (ref 98–110)
CHOL/HDLC RATIO: 2.6 (CALC)
CHOLESTEROL, TOTAL: 95 MG/DL
CREATININE: 0.84 MG/DL (ref 0.7–1.25)
EGFR IF AFRICN AM: 109 ML/MIN/1.73M2
EGFR IF NONAFRICN AM: 94 ML/MIN/1.73M2
GLOBULIN: 2.5 G/DL (CALC) (ref 1.9–3.7)
GLUCOSE: 102 MG/DL (ref 65–99)
HDL CHOLESTEROL: 37 MG/DL
HEMOGLOBIN A1C: 4.8 % OF TOTAL HGB
LDL-CHOLESTEROL: 40 MG/DL (CALC)
NON-HDL CHOLESTEROL: 58 MG/DL (CALC)
POTASSIUM: 3.9 MMOL/L (ref 3.5–5.3)
PROTEIN, TOTAL: 7 G/DL (ref 6.1–8.1)
SODIUM: 140 MMOL/L (ref 135–146)
TRIGLYCERIDES: 93 MG/DL

## 2019-09-25 ENCOUNTER — OFFICE VISIT (OUTPATIENT)
Dept: FAMILY MEDICINE CLINIC | Facility: CLINIC | Age: 64
End: 2019-09-25
Payer: COMMERCIAL

## 2019-09-25 VITALS
TEMPERATURE: 98 F | SYSTOLIC BLOOD PRESSURE: 128 MMHG | HEIGHT: 70.75 IN | DIASTOLIC BLOOD PRESSURE: 80 MMHG | HEART RATE: 72 BPM | WEIGHT: 244 LBS | BODY MASS INDEX: 34.16 KG/M2 | RESPIRATION RATE: 14 BRPM

## 2019-09-25 DIAGNOSIS — R79.89 ELEVATED LFTS: ICD-10-CM

## 2019-09-25 DIAGNOSIS — I10 HYPERTENSION GOAL BP (BLOOD PRESSURE) < 130/80: ICD-10-CM

## 2019-09-25 DIAGNOSIS — E11.9 TYPE 2 DIABETES MELLITUS WITHOUT COMPLICATION, WITHOUT LONG-TERM CURRENT USE OF INSULIN (HCC): Primary | ICD-10-CM

## 2019-09-25 DIAGNOSIS — Z13.29 SCREENING FOR THYROID DISORDER: ICD-10-CM

## 2019-09-25 DIAGNOSIS — E78.2 MIXED HYPERLIPIDEMIA: ICD-10-CM

## 2019-09-25 DIAGNOSIS — Z13.0 SCREENING, ANEMIA, DEFICIENCY, IRON: ICD-10-CM

## 2019-09-25 DIAGNOSIS — I10 ESSENTIAL HYPERTENSION: ICD-10-CM

## 2019-09-25 PROCEDURE — 99214 OFFICE O/P EST MOD 30 MIN: CPT | Performed by: FAMILY MEDICINE

## 2019-09-25 PROCEDURE — 90471 IMMUNIZATION ADMIN: CPT | Performed by: FAMILY MEDICINE

## 2019-09-25 PROCEDURE — 90686 IIV4 VACC NO PRSV 0.5 ML IM: CPT | Performed by: FAMILY MEDICINE

## 2019-09-25 RX ORDER — VALSARTAN AND HYDROCHLOROTHIAZIDE 320; 25 MG/1; MG/1
1 TABLET, FILM COATED ORAL
Qty: 90 TABLET | Refills: 3 | Status: SHIPPED | OUTPATIENT
Start: 2019-09-25 | End: 2019-12-17

## 2019-09-25 NOTE — PROGRESS NOTES
HPI:   Patient presents with:  Test Results: A1C    Ivette Chase is a 59year old male who presents for recheck of his diabetes. Subjective    Excellent control on ozempic, no weight loss, but consistently low A1c without hypoglyecemia events.    Lab Re pulses are 2+ on the right side, and 2+ on the left side. Posterior tibial pulses are 2+ on the right side, and 2+ on the left side. Edema not present. Pulmonary/Chest: Effort normal and breath sounds normal. No respiratory distress.  He has no w Valsartan -25, metoprolol ER 25, amlodipine 5         Relevant Medications    Valsartan-hydroCHLOROthiazide 320-25 MG Oral Tab    Mixed hyperlipidemia    Overview     Atorvastatin 20            Endocrine    Type 2 diabetes mellitus ([facility] - Aiyana Gonsalves

## 2019-10-03 ENCOUNTER — OFFICE VISIT (OUTPATIENT)
Dept: CARDIOLOGY | Age: 64
End: 2019-10-03

## 2019-10-03 ENCOUNTER — TELEPHONE (OUTPATIENT)
Dept: CARDIOLOGY | Age: 64
End: 2019-10-03

## 2019-10-03 VITALS
WEIGHT: 243 LBS | HEIGHT: 71 IN | BODY MASS INDEX: 34.02 KG/M2 | DIASTOLIC BLOOD PRESSURE: 70 MMHG | HEART RATE: 82 BPM | SYSTOLIC BLOOD PRESSURE: 100 MMHG

## 2019-10-03 DIAGNOSIS — R93.1 AGATSTON CAC SCORE, >400: ICD-10-CM

## 2019-10-03 DIAGNOSIS — I71.9 AORTIC ANEURYSM WITHOUT RUPTURE, UNSPECIFIED PORTION OF AORTA (CMD): ICD-10-CM

## 2019-10-03 DIAGNOSIS — I25.10 NONOCCLUSIVE CORONARY ATHEROSCLEROSIS OF NATIVE CORONARY ARTERY: Primary | ICD-10-CM

## 2019-10-03 PROCEDURE — 99214 OFFICE O/P EST MOD 30 MIN: CPT | Performed by: NURSE PRACTITIONER

## 2019-10-03 RX ORDER — METOPROLOL SUCCINATE 25 MG/1
25 TABLET, EXTENDED RELEASE ORAL AT BEDTIME
Qty: 90 TABLET | Refills: 3 | Status: SHIPPED | OUTPATIENT
Start: 2019-10-03 | End: 2020-10-12

## 2019-10-03 RX ORDER — ATORVASTATIN CALCIUM 20 MG/1
20 TABLET, FILM COATED ORAL DAILY
Qty: 90 TABLET | Refills: 3 | Status: SHIPPED | OUTPATIENT
Start: 2019-10-03 | End: 2020-10-12

## 2019-10-03 RX ORDER — VALSARTAN AND HYDROCHLOROTHIAZIDE 320; 25 MG/1; MG/1
1 TABLET, FILM COATED ORAL DAILY
Qty: 90 TABLET | Refills: 3 | Status: SHIPPED | OUTPATIENT
Start: 2019-10-03

## 2019-10-03 RX ORDER — AMLODIPINE BESYLATE 5 MG/1
5 TABLET ORAL DAILY
Status: SHIPPED | COMMUNITY
Start: 2019-10-03

## 2019-10-03 ASSESSMENT — ENCOUNTER SYMPTOMS
HEMOPTYSIS: 0
WEIGHT GAIN: 0
COUGH: 0
ALLERGIC/IMMUNOLOGIC COMMENTS: NO NEW FOOD ALLERGIES
CHILLS: 0
WEIGHT LOSS: 0
HEMATOCHEZIA: 0
BRUISES/BLEEDS EASILY: 0
FEVER: 0
SUSPICIOUS LESIONS: 0

## 2019-10-03 ASSESSMENT — PATIENT HEALTH QUESTIONNAIRE - PHQ9
SUM OF ALL RESPONSES TO PHQ9 QUESTIONS 1 AND 2: 0
1. LITTLE INTEREST OR PLEASURE IN DOING THINGS: NOT AT ALL
SUM OF ALL RESPONSES TO PHQ9 QUESTIONS 1 AND 2: 0
2. FEELING DOWN, DEPRESSED OR HOPELESS: NOT AT ALL

## 2019-12-05 DIAGNOSIS — E11.9 TYPE 2 DIABETES MELLITUS WITHOUT COMPLICATION, WITHOUT LONG-TERM CURRENT USE OF INSULIN (HCC): ICD-10-CM

## 2019-12-05 RX ORDER — SEMAGLUTIDE 1.34 MG/ML
INJECTION, SOLUTION SUBCUTANEOUS
Qty: 4.5 ML | Refills: 4 | OUTPATIENT
Start: 2019-12-05 | End: 2021-02-03

## 2019-12-05 NOTE — TELEPHONE ENCOUNTER
Refill request for Ozempic fails protocol because last microalbumin urine was  completed 4/2/18. There is an order for microalbumin, but it has not been completed. LOV 9/25/19 and at that time, pt was advised to follow up in 6 months.   Future Appointment

## 2019-12-09 DIAGNOSIS — E11.9 TYPE 2 DIABETES MELLITUS WITHOUT COMPLICATION, WITHOUT LONG-TERM CURRENT USE OF INSULIN (HCC): ICD-10-CM

## 2019-12-16 ENCOUNTER — TELEPHONE (OUTPATIENT)
Dept: FAMILY MEDICINE CLINIC | Facility: CLINIC | Age: 64
End: 2019-12-16

## 2019-12-16 DIAGNOSIS — I10 HYPERTENSION GOAL BP (BLOOD PRESSURE) < 130/80: ICD-10-CM

## 2019-12-16 RX ORDER — VALSARTAN 320 MG/1
320 TABLET ORAL DAILY
Qty: 90 TABLET | Refills: 2 | Status: SHIPPED | OUTPATIENT
Start: 2019-12-16 | End: 2021-08-02

## 2019-12-16 RX ORDER — HYDROCHLOROTHIAZIDE 25 MG/1
25 TABLET ORAL DAILY
Qty: 90 TABLET | Refills: 2 | Status: SHIPPED | OUTPATIENT
Start: 2019-12-16 | End: 2020-07-06

## 2019-12-16 NOTE — TELEPHONE ENCOUNTER
Patient called states pharmacy stated the Valsartan-hydroCHLOROthiazide 320-25 MG Oral Tab is not available if can prescribe an alternative?

## 2019-12-17 DIAGNOSIS — I10 HYPERTENSION GOAL BP (BLOOD PRESSURE) < 130/80: ICD-10-CM

## 2019-12-17 RX ORDER — VALSARTAN AND HYDROCHLOROTHIAZIDE 320; 25 MG/1; MG/1
1 TABLET, FILM COATED ORAL
Qty: 7 TABLET | Refills: 1 | Status: SHIPPED | OUTPATIENT
Start: 2019-12-17 | End: 2019-12-17

## 2019-12-17 RX ORDER — VALSARTAN AND HYDROCHLOROTHIAZIDE 320; 25 MG/1; MG/1
1 TABLET, FILM COATED ORAL
Qty: 90 TABLET | Refills: 1 | Status: SHIPPED | OUTPATIENT
Start: 2019-12-17 | End: 2021-03-16

## 2019-12-17 NOTE — TELEPHONE ENCOUNTER
Pt's spouse Shama Trentd calling on status of his meds.  Requesting they be sent to [place] as he is all out and can't wait for mail order

## 2020-01-02 DIAGNOSIS — I10 HYPERTENSION GOAL BP (BLOOD PRESSURE) < 130/80: ICD-10-CM

## 2020-01-02 RX ORDER — AMLODIPINE BESYLATE 5 MG/1
TABLET ORAL
Qty: 90 TABLET | Refills: 1 | Status: SHIPPED | OUTPATIENT
Start: 2020-01-02 | End: 2020-10-12

## 2020-01-02 NOTE — TELEPHONE ENCOUNTER
Requested Prescriptions     Pending Prescriptions Disp Refills   • AMLODIPINE BESYLATE 5 MG Oral Tab [Pharmacy Med Name: AMLODIPINE  5MG  TAB] 90 tablet 3     Sig: TAKE 1 TABLET BY MOUTH  DAILY     LOV 9/25/2019     Appointment scheduled: 5/20/2020 Irvin Haney

## 2020-03-29 ENCOUNTER — PATIENT MESSAGE (OUTPATIENT)
Dept: FAMILY MEDICINE CLINIC | Facility: CLINIC | Age: 65
End: 2020-03-29

## 2020-03-29 DIAGNOSIS — R21 RASH: Primary | ICD-10-CM

## 2020-03-29 PROCEDURE — 99421 OL DIG E/M SVC 5-10 MIN: CPT | Performed by: FAMILY MEDICINE

## 2020-03-29 NOTE — TELEPHONE ENCOUNTER
From: Micah Prather  To: Montez Esposito MD  Sent: 3/29/2020 8:35 AM CDT  Subject: Prescription Question    Ivan [place] has been using Alclometasone Dipropionate cream 0.05% for his flaky skin on forehead.  He thought you prescribed but don’t see it on prescription

## 2020-03-30 RX ORDER — BETAMETHASONE DIPROPIONATE 0.05 %
1 OINTMENT (GRAM) TOPICAL 2 TIMES DAILY
Qty: 50 G | Refills: 1 | Status: SHIPPED | OUTPATIENT
Start: 2020-03-30

## 2020-03-30 NOTE — TELEPHONE ENCOUNTER
Virtual/Telephone Check-In    Ariel Mejia     Duration of the service: 6 mionutes    Summary of topics discussed:  Rash and refill old mes    Diagnoses and all orders for this visit:    Rash  -     Betamethasone Dipropionate 0.05 % External Ointment; A

## 2020-03-30 NOTE — TELEPHONE ENCOUNTER
5 minutes spent on this. Diagnoses and all orders for this visit:    Rash  -     Betamethasone Dipropionate 0.05 % External Ointment; Apply 1 g topically 2 (two) times daily.

## 2020-07-03 NOTE — ASSESSMENT & PLAN NOTE
Stable, Continue present management.     Blood Pressure and Cardiac Medications          AMLODIPINE BESYLATE 5 MG Oral Tab    Valsartan-hydroCHLOROthiazide 320-25 MG Oral Tab    valsartan 320 MG Oral Tab    Metoprolol Succinate ER (TOPROL XL) 25 MG Oral Tab

## 2020-07-06 ENCOUNTER — HOSPITAL ENCOUNTER (OUTPATIENT)
Dept: CT IMAGING | Facility: HOSPITAL | Age: 65
Discharge: HOME | End: 2020-07-06
Attending: THORACIC SURGERY (CARDIOTHORACIC VASCULAR SURGERY)
Payer: COMMERCIAL

## 2020-07-06 ENCOUNTER — OFFICE VISIT (OUTPATIENT)
Dept: FAMILY MEDICINE CLINIC | Facility: CLINIC | Age: 65
End: 2020-07-06
Payer: COMMERCIAL

## 2020-07-06 VITALS
WEIGHT: 251.63 LBS | BODY MASS INDEX: 34.46 KG/M2 | TEMPERATURE: 98 F | HEIGHT: 71.5 IN | HEART RATE: 76 BPM | DIASTOLIC BLOOD PRESSURE: 84 MMHG | RESPIRATION RATE: 18 BRPM | SYSTOLIC BLOOD PRESSURE: 138 MMHG

## 2020-07-06 DIAGNOSIS — I10 ESSENTIAL HYPERTENSION: ICD-10-CM

## 2020-07-06 DIAGNOSIS — Z00.00 ANNUAL PHYSICAL EXAM: Primary | ICD-10-CM

## 2020-07-06 DIAGNOSIS — I25.10 NONOCCLUSIVE CORONARY ATHEROSCLEROSIS OF NATIVE CORONARY ARTERY: ICD-10-CM

## 2020-07-06 DIAGNOSIS — I71.6 THORACOABDOMINAL AORTIC ANEURYSM (TAAA) WITHOUT RUPTURE (HCC): ICD-10-CM

## 2020-07-06 DIAGNOSIS — I77.810 DILATATION OF THORACIC AORTA (HCC): ICD-10-CM

## 2020-07-06 DIAGNOSIS — I71.2 ASCENDING AORTIC ANEURYSM (HCC): ICD-10-CM

## 2020-07-06 DIAGNOSIS — E11.9 TYPE 2 DIABETES MELLITUS WITHOUT COMPLICATION, WITHOUT LONG-TERM CURRENT USE OF INSULIN (HCC): ICD-10-CM

## 2020-07-06 DIAGNOSIS — I27.20 PULMONARY HYPERTENSION (HCC): ICD-10-CM

## 2020-07-06 DIAGNOSIS — E78.2 MIXED HYPERLIPIDEMIA: ICD-10-CM

## 2020-07-06 PROBLEM — I71.9 AORTIC ANEURYSM WITHOUT RUPTURE: Status: ACTIVE | Noted: 2019-10-03

## 2020-07-06 PROBLEM — I71.9 AORTIC ANEURYSM WITHOUT RUPTURE (HCC): Status: ACTIVE | Noted: 2019-10-03

## 2020-07-06 PROCEDURE — 99396 PREV VISIT EST AGE 40-64: CPT | Performed by: FAMILY MEDICINE

## 2020-07-06 PROCEDURE — 71250 CT THORAX DX C-: CPT | Performed by: THORACIC SURGERY (CARDIOTHORACIC VASCULAR SURGERY)

## 2020-07-06 RX ORDER — ALCLOMETASONE DIPROPIONATE 0.5 MG/G
1 CREAM TOPICAL 2 TIMES DAILY
Qty: 15 G | Refills: 1 | Status: SHIPPED | OUTPATIENT
Start: 2020-07-06 | End: 2021-08-02

## 2020-07-06 RX ORDER — HYDROCHLOROTHIAZIDE 25 MG/1
25 TABLET ORAL DAILY
Qty: 90 TABLET | Refills: 2 | Status: SHIPPED | OUTPATIENT
Start: 2020-07-06 | End: 2020-07-06

## 2020-07-06 RX ORDER — ALCLOMETASONE DIPROPIONATE 0.5 MG/G
CREAM TOPICAL 2 TIMES DAILY
COMMUNITY
End: 2020-07-06

## 2020-07-06 NOTE — PROGRESS NOTES
Yann Carrera is a 62year old male who presents for a complete physical exam.   HPI:   Patient presents with: Well Adult:  Annual physical      His last annual assessment has been over 1 year: Annual Physical due on 11/14/2019       Pt complains of dong 09/10/2019 09:26 AM    TRIG 93 09/10/2019 09:26 AM    LDL 40 09/10/2019 09:26 AM    NONHDLC 58 09/10/2019 09:26 AM       Lab Results   Component Value Date/Time    A1C 4.8 09/10/2019 09:26 AM            Aspirin Buf,CaCarb-MgCarb-MgO, 81 MG Oral Tab, 1 tabl cancer in his mother.      He has a current medication list which includes the following long-term medication(s): betamethasone dipropionate, amlodipine besylate, valsartan-hydrochlorothiazide, atorvastatin, amoxicillin, metoprolol succinate er, and aspirin 11/18/2015   • FLULAVAL 6 months & older 0.5 ml Prefilled syringe (97834) 09/25/2019   • Influenza 03/01/2018, 10/06/2018   • TDAP 11/14/2018   • Zoster Vaccine Recombinant Adjuvanted (Shingrix) 05/10/2019, 07/20/2019   Pended Date(s) Pended   • Kenalog 40 Nonocclusive coronary atherosclerosis of native coronary artery    Overview     [facility]         Current Assessment & Plan     Stable continue present management          Pulmonary hypertension ([facility] [address]    Overview     9/2016 echo 35 m

## 2020-07-28 LAB
ABSOLUTE BASOPHILS: 23 CELLS/UL (ref 0–200)
ABSOLUTE EOSINOPHILS: 103 CELLS/UL (ref 15–500)
ABSOLUTE LYMPHOCYTES: 1351 CELLS/UL (ref 850–3900)
ABSOLUTE MONOCYTES: 405 CELLS/UL (ref 200–950)
ABSOLUTE NEUTROPHILS: 3819 CELLS/UL (ref 1500–7800)
ALBUMIN/GLOBULIN RATIO: 1.8 (CALC) (ref 1–2.5)
ALBUMIN: 4.4 G/DL (ref 3.6–5.1)
ALKALINE PHOSPHATASE: 85 U/L (ref 35–144)
ALT: 50 U/L (ref 9–46)
AST: 37 U/L (ref 10–35)
BASOPHILS: 0.4 %
BILIRUBIN, TOTAL: 1.6 MG/DL (ref 0.2–1.2)
BUN: 19 MG/DL (ref 7–25)
CALCIUM: 9.5 MG/DL (ref 8.6–10.3)
CARBON DIOXIDE: 25 MMOL/L (ref 20–32)
CHLORIDE: 105 MMOL/L (ref 98–110)
CHOL/HDLC RATIO: 2.3 (CALC)
CHOLESTEROL, TOTAL: 102 MG/DL
CREATININE, RANDOM URINE: 198 MG/DL (ref 20–320)
CREATININE: 0.87 MG/DL (ref 0.7–1.25)
EGFR IF AFRICN AM: 106 ML/MIN/1.73M2
EGFR IF NONAFRICN AM: 92 ML/MIN/1.73M2
EOSINOPHILS: 1.8 %
GLOBULIN: 2.5 G/DL (CALC) (ref 1.9–3.7)
GLUCOSE: 129 MG/DL (ref 65–99)
HDL CHOLESTEROL: 44 MG/DL
HEMATOCRIT: 45.9 % (ref 38.5–50)
HEMOGLOBIN A1C: 5 % OF TOTAL HGB
HEMOGLOBIN: 15.7 G/DL (ref 13.2–17.1)
LDL-CHOLESTEROL: 40 MG/DL (CALC)
LYMPHOCYTES: 23.7 %
MCH: 31.8 PG (ref 27–33)
MCHC: 34.2 G/DL (ref 32–36)
MCV: 93.1 FL (ref 80–100)
MICROALBUMIN/CREATININE RATIO, RANDOM URINE: 31 MCG/MG CREAT
MICROALBUMIN: 6.1 MG/DL
MONOCYTES: 7.1 %
MPV: 10.6 FL (ref 7.5–12.5)
NEUTROPHILS: 67 %
NON-HDL CHOLESTEROL: 58 MG/DL (CALC)
PLATELET COUNT: 156 THOUSAND/UL (ref 140–400)
POTASSIUM: 3.7 MMOL/L (ref 3.5–5.3)
PROTEIN, TOTAL: 6.9 G/DL (ref 6.1–8.1)
RDW: 13.2 % (ref 11–15)
RED BLOOD CELL COUNT: 4.93 MILLION/UL (ref 4.2–5.8)
SODIUM: 139 MMOL/L (ref 135–146)
TRIGLYCERIDES: 95 MG/DL
TSH W/REFLEX TO FT4: 1.37 MIU/L (ref 0.4–4.5)
WHITE BLOOD CELL COUNT: 5.7 THOUSAND/UL (ref 3.8–10.8)

## 2020-09-30 ENCOUNTER — APPOINTMENT (OUTPATIENT)
Dept: CARDIOLOGY | Age: 65
End: 2020-09-30

## 2020-10-07 ENCOUNTER — APPOINTMENT (OUTPATIENT)
Dept: CARDIOLOGY | Age: 65
End: 2020-10-07

## 2020-10-10 DIAGNOSIS — I10 HYPERTENSION GOAL BP (BLOOD PRESSURE) < 130/80: ICD-10-CM

## 2020-10-12 RX ORDER — AMLODIPINE BESYLATE 5 MG/1
TABLET ORAL
Qty: 90 TABLET | Refills: 0 | Status: SHIPPED | OUTPATIENT
Start: 2020-10-12 | End: 2020-11-30

## 2020-10-12 RX ORDER — ATORVASTATIN CALCIUM 20 MG/1
20 TABLET, FILM COATED ORAL DAILY
Qty: 90 TABLET | Refills: 3 | Status: SHIPPED | OUTPATIENT
Start: 2020-10-12

## 2020-10-12 RX ORDER — METOPROLOL SUCCINATE 25 MG/1
25 TABLET, EXTENDED RELEASE ORAL AT BEDTIME
Qty: 90 TABLET | Refills: 3 | Status: SHIPPED | OUTPATIENT
Start: 2020-10-12

## 2020-10-12 NOTE — TELEPHONE ENCOUNTER
Requested Prescriptions     Pending Prescriptions Disp Refills   • AMLODIPINE BESYLATE 5 MG Oral Tab [Pharmacy Med Name: AMLODIPINE  5MG  TAB] 90 tablet 3     Sig: TAKE 1 TABLET BY MOUTH  DAILY     LOV 7/6/2020         Appointment scheduled: Visit date not

## 2020-11-30 DIAGNOSIS — I10 HYPERTENSION GOAL BP (BLOOD PRESSURE) < 130/80: ICD-10-CM

## 2020-11-30 RX ORDER — AMLODIPINE BESYLATE 5 MG/1
TABLET ORAL
Qty: 90 TABLET | Refills: 0 | Status: SHIPPED | OUTPATIENT
Start: 2020-11-30 | End: 2021-02-23

## 2020-11-30 NOTE — TELEPHONE ENCOUNTER
Requested Prescriptions     Pending Prescriptions Disp Refills   • AMLODIPINE BESYLATE 5 MG Oral Tab [Pharmacy Med Name: AMLODIPINE  5MG  TAB] 90 tablet 3     Sig: TAKE 1 TABLET BY MOUTH  DAILY     LOV 7/6/2020     Patient was asked to follow-up in: 6 sarah

## 2021-02-02 DIAGNOSIS — E11.9 TYPE 2 DIABETES MELLITUS WITHOUT COMPLICATION, WITHOUT LONG-TERM CURRENT USE OF INSULIN (HCC): ICD-10-CM

## 2021-02-03 RX ORDER — SEMAGLUTIDE 1.34 MG/ML
INJECTION, SOLUTION SUBCUTANEOUS
Qty: 4.5 ML | Refills: 0 | Status: SHIPPED | OUTPATIENT
Start: 2021-02-03 | End: 2021-04-28

## 2021-02-03 NOTE — TELEPHONE ENCOUNTER
Patient is in [place] until mid May or so and is almost out he only has 3 doses left of his Ozempic and needs it refilled. Please send. He will schedule a follow when he returns.   I tried to get him to now but his wife said they don't have a clear return

## 2021-02-03 NOTE — TELEPHONE ENCOUNTER
LM on home and cell for pt to schedule his diabetic follow up appointment and to make sure patient has enough medication to get to appt.

## 2021-02-03 NOTE — TELEPHONE ENCOUNTER
Requested Prescriptions     Pending Prescriptions Disp Refills   • OZEMPIC, 0.25 OR 0.5 MG/DOSE, 2 MG/1.5ML Subcutaneous Solution Pen-injector [Pharmacy Med Name: OZEMPIC 0.25-0.5 MG DOSE PEN[*R]] 4.5 mL 0     Sig: INJECT 0.5MG UNDER THE SKIN EVERY WEEK

## 2021-02-22 ENCOUNTER — TELEPHONE (OUTPATIENT)
Dept: FAMILY MEDICINE CLINIC | Facility: CLINIC | Age: 66
End: 2021-02-22

## 2021-02-22 DIAGNOSIS — I10 HYPERTENSION GOAL BP (BLOOD PRESSURE) < 130/80: ICD-10-CM

## 2021-02-23 RX ORDER — AMLODIPINE BESYLATE 5 MG/1
TABLET ORAL
Qty: 90 TABLET | Refills: 0 | Status: SHIPPED | OUTPATIENT
Start: 2021-02-23 | End: 2021-07-10

## 2021-03-02 NOTE — TELEPHONE ENCOUNTER
Patient is in [place] until May, scheduled an appointment for 05/19/21. Will not have enough medication to last him.  Can he get a refill to last him until he returns

## 2021-03-03 ENCOUNTER — TELEPHONE (OUTPATIENT)
Dept: FAMILY MEDICINE CLINIC | Facility: CLINIC | Age: 66
End: 2021-03-03

## 2021-03-03 NOTE — TELEPHONE ENCOUNTER
Received Diabetic eye exam from [facility].  Placed in triage for abstracting

## 2021-03-16 DIAGNOSIS — I10 HYPERTENSION GOAL BP (BLOOD PRESSURE) < 130/80: ICD-10-CM

## 2021-03-16 RX ORDER — VALSARTAN AND HYDROCHLOROTHIAZIDE 320; 25 MG/1; MG/1
1 TABLET, FILM COATED ORAL
Qty: 90 TABLET | Refills: 3 | Status: SHIPPED | OUTPATIENT
Start: 2021-03-16 | End: 2021-12-09

## 2021-03-16 NOTE — TELEPHONE ENCOUNTER
Requested Prescriptions     Pending Prescriptions Disp Refills   • VALSARTAN-HYDROCHLOROTHIAZIDE 320-25 MG Oral Tab [Pharmacy Med Name: VALSARTAN/HCTZ 320-25MG TABLET] 90 tablet 3     Sig: TAKE 1 TABLET BY MOUTH ONCE DAILY     LOV 7/6/2020     Patient was

## 2021-03-18 DIAGNOSIS — Z23 NEED FOR VACCINATION: ICD-10-CM

## 2021-04-27 DIAGNOSIS — E11.9 TYPE 2 DIABETES MELLITUS WITHOUT COMPLICATION, WITHOUT LONG-TERM CURRENT USE OF INSULIN (HCC): ICD-10-CM

## 2021-04-28 RX ORDER — SEMAGLUTIDE 1.34 MG/ML
INJECTION, SOLUTION SUBCUTANEOUS
Qty: 4.5 ML | Refills: 0 | Status: SHIPPED | OUTPATIENT
Start: 2021-04-28 | End: 2021-08-05

## 2021-05-18 NOTE — ASSESSMENT & PLAN NOTE
Stable, Continue present management.     Blood Pressure and Cardiac Medications          VALSARTAN-HYDROCHLOROTHIAZIDE 320-25 MG Oral Tab    AMLODIPINE BESYLATE 5 MG Oral Tab    valsartan 320 MG Oral Tab    Metoprolol Succinate ER (TOPROL XL) 25 MG Oral Tab

## 2021-05-19 ENCOUNTER — OFFICE VISIT (OUTPATIENT)
Dept: FAMILY MEDICINE CLINIC | Facility: CLINIC | Age: 66
End: 2021-05-19
Payer: COMMERCIAL

## 2021-05-19 ENCOUNTER — OFFICE VISIT (OUTPATIENT)
Dept: CARDIOLOGY | Age: 66
End: 2021-05-19

## 2021-05-19 VITALS
DIASTOLIC BLOOD PRESSURE: 84 MMHG | HEIGHT: 72 IN | WEIGHT: 253 LBS | BODY MASS INDEX: 34.27 KG/M2 | HEART RATE: 72 BPM | SYSTOLIC BLOOD PRESSURE: 130 MMHG

## 2021-05-19 VITALS
BODY MASS INDEX: 35.7 KG/M2 | HEIGHT: 71 IN | SYSTOLIC BLOOD PRESSURE: 138 MMHG | WEIGHT: 255 LBS | DIASTOLIC BLOOD PRESSURE: 80 MMHG | TEMPERATURE: 98 F | HEART RATE: 88 BPM | RESPIRATION RATE: 20 BRPM

## 2021-05-19 DIAGNOSIS — E78.00 PURE HYPERCHOLESTEROLEMIA: Chronic | ICD-10-CM

## 2021-05-19 DIAGNOSIS — I10 ESSENTIAL HYPERTENSION: ICD-10-CM

## 2021-05-19 DIAGNOSIS — E11.9 TYPE 2 DIABETES MELLITUS WITHOUT COMPLICATION, WITHOUT LONG-TERM CURRENT USE OF INSULIN (HCC): Primary | ICD-10-CM

## 2021-05-19 DIAGNOSIS — I25.10 NONOCCLUSIVE CORONARY ATHEROSCLEROSIS OF NATIVE CORONARY ARTERY: ICD-10-CM

## 2021-05-19 DIAGNOSIS — I71.20 THORACIC AORTIC ANEURYSM WITHOUT RUPTURE (CMD): Primary | ICD-10-CM

## 2021-05-19 DIAGNOSIS — E78.2 MIXED HYPERLIPIDEMIA: ICD-10-CM

## 2021-05-19 PROCEDURE — 3075F SYST BP GE 130 - 139MM HG: CPT | Performed by: FAMILY MEDICINE

## 2021-05-19 PROCEDURE — 99215 OFFICE O/P EST HI 40 MIN: CPT | Performed by: INTERNAL MEDICINE

## 2021-05-19 PROCEDURE — 3079F DIAST BP 80-89 MM HG: CPT | Performed by: FAMILY MEDICINE

## 2021-05-19 PROCEDURE — 3008F BODY MASS INDEX DOCD: CPT | Performed by: FAMILY MEDICINE

## 2021-05-19 PROCEDURE — 99214 OFFICE O/P EST MOD 30 MIN: CPT | Performed by: FAMILY MEDICINE

## 2021-05-19 ASSESSMENT — ENCOUNTER SYMPTOMS
COUGH: 0
FEVER: 0
SUSPICIOUS LESIONS: 0
ALLERGIC/IMMUNOLOGIC COMMENTS: NO NEW FOOD ALLERGIES
HEMATOCHEZIA: 0
HEMOPTYSIS: 0
BRUISES/BLEEDS EASILY: 0
WEIGHT LOSS: 0
CHILLS: 0
WEIGHT GAIN: 0

## 2021-05-19 ASSESSMENT — PATIENT HEALTH QUESTIONNAIRE - PHQ9
SUM OF ALL RESPONSES TO PHQ9 QUESTIONS 1 AND 2: 0
SUM OF ALL RESPONSES TO PHQ9 QUESTIONS 1 AND 2: 0
1. LITTLE INTEREST OR PLEASURE IN DOING THINGS: NOT AT ALL
CLINICAL INTERPRETATION OF PHQ9 SCORE: NO FURTHER SCREENING NEEDED
2. FEELING DOWN, DEPRESSED OR HOPELESS: NOT AT ALL
CLINICAL INTERPRETATION OF PHQ2 SCORE: NO FURTHER SCREENING NEEDED

## 2021-05-19 NOTE — PROGRESS NOTES
had concerns including Diabetes (check up ). Scar Castro is a 60year old male who presents for recheck of his diabetes. HPI/Subjective:   Doing well. Last A1c value was 5% done 7/27/2020.      Pneumococcal Vaccine: Birth to 64yrs(1 of 1 - PPSV23) Rate and Rhythm: Normal rate and regular rhythm. Pulses:           Posterior tibial pulses are 2+ on the right side and 2+ on the left side. Heart sounds: Normal heart sounds. No murmur heard.      Pulmonary:      Effort: Pulmonary effort is gypsy 11/4/2016          Current Assessment & Plan     As for his Diabetes, it is well controlled, no significant medication side effects noted.      Recommendations are: continue present meds, lose weight by increased dietary compliance and exercise and will thais

## 2021-06-30 PROCEDURE — 3044F HG A1C LEVEL LT 7.0%: CPT | Performed by: FAMILY MEDICINE

## 2021-06-30 PROCEDURE — 3061F NEG MICROALBUMINURIA REV: CPT | Performed by: FAMILY MEDICINE

## 2021-07-01 LAB
ALBUMIN/GLOBULIN RATIO: 1.7 (CALC) (ref 1–2.5)
ALBUMIN: 4.4 G/DL (ref 3.6–5.1)
ALKALINE PHOSPHATASE: 73 U/L (ref 35–144)
ALT: 82 U/L (ref 9–46)
AST: 57 U/L (ref 10–35)
BILIRUBIN, TOTAL: 1.3 MG/DL (ref 0.2–1.2)
BUN: 13 MG/DL (ref 7–25)
CALCIUM: 9.9 MG/DL (ref 8.6–10.3)
CARBON DIOXIDE: 27 MMOL/L (ref 20–32)
CHLORIDE: 106 MMOL/L (ref 98–110)
CHOL/HDLC RATIO: 2.4 (CALC)
CHOLESTEROL, TOTAL: 106 MG/DL
CREATININE, RANDOM URINE: 83 MG/DL (ref 20–320)
CREATININE: 0.81 MG/DL (ref 0.7–1.25)
EGFR IF AFRICN AM: 109 ML/MIN/1.73M2
EGFR IF NONAFRICN AM: 94 ML/MIN/1.73M2
GLOBULIN: 2.6 G/DL (CALC) (ref 1.9–3.7)
GLUCOSE: 143 MG/DL (ref 65–99)
HDL CHOLESTEROL: 45 MG/DL
HEMOGLOBIN A1C: 5.5 % OF TOTAL HGB
LDL-CHOLESTEROL: 41 MG/DL (CALC)
MICROALBUMIN/CREATININE RATIO, RANDOM URINE: 25 MCG/MG CREAT
MICROALBUMIN: 2.1 MG/DL
NON-HDL CHOLESTEROL: 61 MG/DL (CALC)
POTASSIUM: 3.8 MMOL/L (ref 3.5–5.3)
PROTEIN, TOTAL: 7 G/DL (ref 6.1–8.1)
SODIUM: 139 MMOL/L (ref 135–146)
TRIGLYCERIDES: 110 MG/DL

## 2021-07-10 DIAGNOSIS — I10 HYPERTENSION GOAL BP (BLOOD PRESSURE) < 130/80: ICD-10-CM

## 2021-07-10 RX ORDER — AMLODIPINE BESYLATE 5 MG/1
TABLET ORAL
Qty: 90 TABLET | Refills: 3 | Status: SHIPPED | OUTPATIENT
Start: 2021-07-10 | End: 2021-12-02

## 2021-08-02 ENCOUNTER — OFFICE VISIT (OUTPATIENT)
Dept: FAMILY MEDICINE CLINIC | Facility: CLINIC | Age: 66
End: 2021-08-02
Payer: COMMERCIAL

## 2021-08-02 ENCOUNTER — TELEPHONE (OUTPATIENT)
Dept: FAMILY MEDICINE CLINIC | Facility: CLINIC | Age: 66
End: 2021-08-02

## 2021-08-02 VITALS
RESPIRATION RATE: 12 BRPM | DIASTOLIC BLOOD PRESSURE: 86 MMHG | HEART RATE: 82 BPM | WEIGHT: 254.38 LBS | BODY MASS INDEX: 35.61 KG/M2 | SYSTOLIC BLOOD PRESSURE: 138 MMHG | HEIGHT: 71 IN

## 2021-08-02 DIAGNOSIS — J01.40 ACUTE NON-RECURRENT PANSINUSITIS: Primary | ICD-10-CM

## 2021-08-02 PROCEDURE — 99213 OFFICE O/P EST LOW 20 MIN: CPT | Performed by: FAMILY MEDICINE

## 2021-08-02 PROCEDURE — 3075F SYST BP GE 130 - 139MM HG: CPT | Performed by: FAMILY MEDICINE

## 2021-08-02 PROCEDURE — 3008F BODY MASS INDEX DOCD: CPT | Performed by: FAMILY MEDICINE

## 2021-08-02 PROCEDURE — 3079F DIAST BP 80-89 MM HG: CPT | Performed by: FAMILY MEDICINE

## 2021-08-02 RX ORDER — CEFUROXIME AXETIL 500 MG/1
500 TABLET ORAL 2 TIMES DAILY
Qty: 20 TABLET | Refills: 0 | Status: SHIPPED | OUTPATIENT
Start: 2021-08-02 | End: 2021-08-12

## 2021-08-02 RX ORDER — DOXYCYCLINE HYCLATE 100 MG/1
100 CAPSULE ORAL 2 TIMES DAILY
COMMUNITY
Start: 2021-07-27

## 2021-08-02 RX ORDER — ALCLOMETASONE DIPROPIONATE 0.5 MG/G
1 CREAM TOPICAL 2 TIMES DAILY
Qty: 45 G | Refills: 1 | Status: SHIPPED | OUTPATIENT
Start: 2021-08-02 | End: 2021-08-03

## 2021-08-02 RX ORDER — CEFUROXIME AXETIL 500 MG/1
500 TABLET ORAL 2 TIMES DAILY
Qty: 20 TABLET | Refills: 0 | Status: SHIPPED | OUTPATIENT
Start: 2021-08-02 | End: 2021-08-02

## 2021-08-02 NOTE — PROGRESS NOTES
Douglas Mckeon is a 60year old male coming in for had concerns including Headache (started 2 and a half weeks, says when bending over his headaches gets worse ) and Cough (was cause from the the nasal drip says its margarita gone away ).     HPI/Subjective:   Cough  Th normal. No respiratory distress. Breath sounds: Normal breath sounds. Abdominal:      Palpations: Abdomen is soft. Musculoskeletal:      Cervical back: Normal range of motion. Skin:     General: Skin is warm and dry.    Neurological:      Mental

## 2021-08-02 NOTE — TELEPHONE ENCOUNTER
Future Appointments   Date Time Provider Anita Varghese   8/2/2021  4:30 PM Rubens Hines MD [facility]

## 2021-08-02 NOTE — TELEPHONE ENCOUNTER
Pt went to  in [place] on7/27/2021 and was diagnosed with a sinus infection. He was prescribed Doxycycline and Robitussin. He has three days of Doxycycline left. Pt states that his symptoms have not improved.  He has a dry cough, sneezing, afebrile

## 2021-08-03 ENCOUNTER — TELEPHONE (OUTPATIENT)
Dept: FAMILY MEDICINE CLINIC | Facility: CLINIC | Age: 66
End: 2021-08-03

## 2021-08-03 RX ORDER — ALCLOMETASONE DIPROPIONATE 0.5 MG/G
1 CREAM TOPICAL 2 TIMES DAILY
Qty: 45 G | Refills: 1 | Status: SHIPPED | OUTPATIENT
Start: 2021-08-03

## 2021-08-03 NOTE — TELEPHONE ENCOUNTER
Patient is requesting his   Alclometasone Dipropionate 0.05 % External Cream 45 g     Be sent to the [facility] on [address] in [place] and that the that  45gram are ordered. current order has 15 gram at the [facility] in [place]  please instruct on when to pic

## 2021-08-05 ENCOUNTER — TELEPHONE (OUTPATIENT)
Dept: FAMILY MEDICINE CLINIC | Facility: CLINIC | Age: 66
End: 2021-08-05

## 2021-08-05 DIAGNOSIS — E11.9 TYPE 2 DIABETES MELLITUS WITHOUT COMPLICATION, WITHOUT LONG-TERM CURRENT USE OF INSULIN (HCC): ICD-10-CM

## 2021-08-05 RX ORDER — SEMAGLUTIDE 1.34 MG/ML
0.5 INJECTION, SOLUTION SUBCUTANEOUS WEEKLY
Qty: 4.5 ML | Refills: 3 | Status: SHIPPED | OUTPATIENT
Start: 2021-08-05 | End: 2021-08-06

## 2021-08-05 NOTE — TELEPHONE ENCOUNTER
LOV 8/2/21  Last DM visit 5/19/21  Lab Results   Component Value Date    A1C 5.5 06/30/2021         Routed to Dr. Natasha Gordon for review, med pended

## 2021-08-05 NOTE — TELEPHONE ENCOUNTER
Pt requesting refill on OZEMPIC. pt states he sent a request in online to his pharmacy bt it was denied. Pt has one week left of medication.      [facility] [address] [facility] [address] [facility] [address] [facility]) & [address] [facility] [address], [phone], [phone]

## 2021-08-06 RX ORDER — SEMAGLUTIDE 1.34 MG/ML
0.5 INJECTION, SOLUTION SUBCUTANEOUS WEEKLY
Qty: 4.5 ML | Refills: 3 | Status: SHIPPED | OUTPATIENT
Start: 2021-08-06

## 2021-10-01 ENCOUNTER — HOSPITAL ENCOUNTER (OUTPATIENT)
Dept: CT IMAGING | Age: 66
Discharge: HOME | End: 2021-10-01
Attending: THORACIC SURGERY (CARDIOTHORACIC VASCULAR SURGERY)
Payer: MEDICARE

## 2021-10-01 DIAGNOSIS — I71.2 ASCENDING AORTIC ANEURYSM (HCC): ICD-10-CM

## 2021-10-01 PROCEDURE — 71250 CT THORAX DX C-: CPT | Performed by: THORACIC SURGERY (CARDIOTHORACIC VASCULAR SURGERY)

## 2021-10-11 ENCOUNTER — OFFICE VISIT (OUTPATIENT)
Dept: URBAN - METROPOLITAN AREA CLINIC 121 | Facility: CLINIC | Age: 66
End: 2021-10-11

## 2021-11-23 ENCOUNTER — TELEPHONE (OUTPATIENT)
Dept: FAMILY MEDICINE CLINIC | Facility: CLINIC | Age: 66
End: 2021-11-23

## 2021-11-23 NOTE — TELEPHONE ENCOUNTER
Received medical records request from [address] [place] [address] requesting patient's medical records from 11/18/19 to 11/18/21 for continuation of care.  All records printed and faxed to [facility] at [phone]

## 2021-12-02 DIAGNOSIS — I10 HYPERTENSION GOAL BP (BLOOD PRESSURE) < 130/80: ICD-10-CM

## 2021-12-02 RX ORDER — AMLODIPINE BESYLATE 5 MG/1
5 TABLET ORAL DAILY
Qty: 90 TABLET | Refills: 3 | Status: SHIPPED | OUTPATIENT
Start: 2021-12-02

## 2021-12-02 NOTE — TELEPHONE ENCOUNTER
Requested Prescriptions     Pending Prescriptions Disp Refills   • amLODIPine 5 MG Oral Tab 90 tablet 3     Sig: Take 1 tablet (5 mg total) by mouth daily.      LOV 8/2/2021     Patient was asked to follow-up in: return if symptoms worsen or fail to improve

## 2021-12-09 DIAGNOSIS — I10 HYPERTENSION GOAL BP (BLOOD PRESSURE) < 130/80: ICD-10-CM

## 2021-12-09 RX ORDER — VALSARTAN AND HYDROCHLOROTHIAZIDE 320; 25 MG/1; MG/1
1 TABLET, FILM COATED ORAL
Qty: 90 TABLET | Refills: 3 | Status: SHIPPED | OUTPATIENT
Start: 2021-12-09

## 2021-12-09 NOTE — TELEPHONE ENCOUNTER
Requested Prescriptions     Pending Prescriptions Disp Refills   • Valsartan-hydroCHLOROthiazide 320-25 MG Oral Tab 90 tablet 3     Sig: Take 1 tablet by mouth once daily.      LOV 8/2/2021     Patient was asked to follow-up in: Follow-up not documented in

## 2021-12-16 ENCOUNTER — OFFICE VISIT (OUTPATIENT)
Dept: URBAN - METROPOLITAN AREA CLINIC 63 | Facility: CLINIC | Age: 66
End: 2021-12-16

## 2022-01-05 ENCOUNTER — TELEPHONE (OUTPATIENT)
Dept: FAMILY MEDICINE CLINIC | Facility: CLINIC | Age: 67
End: 2022-01-05

## 2022-01-05 DIAGNOSIS — G47.33 OBSTRUCTIVE SLEEP APNEA: Primary | ICD-10-CM

## 2022-01-05 NOTE — TELEPHONE ENCOUNTER
TC to patient to let him know we received a request for CPAP supplies. We have no history of patient having sleep apnea. Patient states he didn't have the sleep study done at [facility] but he has had this for years.  I did recommend next time in [place]

## 2022-01-24 ENCOUNTER — TELEPHONE (OUTPATIENT)
Dept: FAMILY MEDICINE CLINIC | Facility: CLINIC | Age: 67
End: 2022-01-24

## 2022-02-08 ENCOUNTER — TELEPHONE (OUTPATIENT)
Dept: FAMILY MEDICINE CLINIC | Facility: CLINIC | Age: 67
End: 2022-02-08

## 2022-02-14 ENCOUNTER — OFFICE VISIT (OUTPATIENT)
Dept: URBAN - METROPOLITAN AREA SURGERY CENTER 4 | Facility: SURGERY CENTER | Age: 67
End: 2022-02-14

## 2022-02-28 ENCOUNTER — OFFICE VISIT (OUTPATIENT)
Dept: URBAN - METROPOLITAN AREA CLINIC 63 | Facility: CLINIC | Age: 67
End: 2022-02-28

## 2022-03-21 RX ORDER — AMLODIPINE BESYLATE 5 MG/1
5 TABLET ORAL DAILY
Qty: 90 TABLET | Refills: 3 | Status: CANCELLED | OUTPATIENT
Start: 2022-03-21

## 2022-03-21 RX ORDER — METOPROLOL SUCCINATE 25 MG/1
25 TABLET, EXTENDED RELEASE ORAL DAILY
Qty: 90 TABLET | Refills: 3 | Status: CANCELLED | OUTPATIENT
Start: 2022-03-21

## 2022-03-21 RX ORDER — VALSARTAN AND HYDROCHLOROTHIAZIDE 320; 25 MG/1; MG/1
TABLET, FILM COATED ORAL
Qty: 90 TABLET | Refills: 3 | Status: SHIPPED | OUTPATIENT
Start: 2022-03-21

## 2022-03-21 RX ORDER — ATORVASTATIN CALCIUM 20 MG/1
20 TABLET, FILM COATED ORAL
Qty: 90 TABLET | Refills: 3 | Status: CANCELLED | OUTPATIENT
Start: 2022-03-21

## 2022-03-22 RX ORDER — AMOXICILLIN 500 MG/1
2000 TABLET, FILM COATED ORAL AS DIRECTED
COMMUNITY
Start: 2021-11-29

## 2022-03-22 RX ORDER — OMEPRAZOLE 20 MG/1
CAPSULE, DELAYED RELEASE ORAL
Qty: 45 CAPSULE | Refills: 1 | Status: SHIPPED | OUTPATIENT
Start: 2022-03-22

## 2022-03-22 RX ORDER — METOPROLOL SUCCINATE 25 MG/1
25 TABLET, EXTENDED RELEASE ORAL DAILY
Qty: 90 TABLET | Refills: 1 | Status: SHIPPED | OUTPATIENT
Start: 2022-03-22

## 2022-03-22 RX ORDER — AMLODIPINE BESYLATE 5 MG/1
5 TABLET ORAL DAILY
Qty: 90 TABLET | Refills: 1 | Status: SHIPPED | OUTPATIENT
Start: 2022-03-22

## 2022-03-22 RX ORDER — ATORVASTATIN CALCIUM 20 MG/1
20 TABLET, FILM COATED ORAL
Qty: 90 TABLET | Refills: 1 | Status: SHIPPED | OUTPATIENT
Start: 2022-03-22

## 2022-03-22 RX ORDER — VALSARTAN AND HYDROCHLOROTHIAZIDE 320; 25 MG/1; MG/1
1 TABLET, FILM COATED ORAL
Qty: 90 TABLET | Refills: 3 | OUTPATIENT
Start: 2022-03-22

## 2022-03-22 NOTE — TELEPHONE ENCOUNTER
Pt calling to request refills on Metoprolol, Omeprazole, Amlodipine, Atorvastatin. Please send to [facility] in [facility]. Aiden Quinn on [address]. Pt has relocated to [facility] for the winter and states that he will be having bloodwork done soon and will send results to [facility].

## 2022-03-22 NOTE — TELEPHONE ENCOUNTER
LOV 8/2/21 was acute. LOV for diabetes was 5/19/21 and at that time, pt was advised to follow up in 6 months Nov,2021. Spoke with pt. They have relocated to [place] [address] and will be establishing with a PCP there in late April    Pt is requesting refills of Metoprolol, Amlodipine, Omeprazole and Atorvastatin until they establish with new PCP. Routed to Dr Josh Nguyễn.

## 2022-04-13 ENCOUNTER — TELEPHONE (OUTPATIENT)
Dept: FAMILY MEDICINE CLINIC | Facility: CLINIC | Age: 67
End: 2022-04-13

## 2022-04-13 NOTE — TELEPHONE ENCOUNTER
Received medical records request from [facility] requesting patient's last office visit and lab results for upcoming cardiothoracic surgery.  Records printed and faxed to [facility] at [phone]

## 2022-05-11 ENCOUNTER — TELEPHONE (OUTPATIENT)
Dept: FAMILY MEDICINE CLINIC | Facility: CLINIC | Age: 67
End: 2022-05-11

## 2022-05-11 NOTE — TELEPHONE ENCOUNTER
Dr. Joe Head, patient's medical records from [place] were placed in your in box. Patient is coming back to [place] and wondering if you can recommend who he see regarding his MRI results.

## 2022-05-13 NOTE — TELEPHONE ENCOUNTER
He really needs to be seen. He has had an MRI in dated May 10, 2022 from [address] [place] [address] in [place]. Through [facility]. I do not have the images but the impression is multilevel spinal degenerative changes identified most significant at L4-L5 and L2-L3 as described above with extensive description. Please see above discussion and correlate with clinical exam findings. As I have not examined him I cannot assess what would be the next step. This is not something I can do over correspondence of this manner. He really needs to be seen by somebody to give more guidance. He may need to see pain clinic or he may need to see neurosurgeon but neither case he will need to have the actual digital images to take a look at this.

## 2022-05-13 NOTE — TELEPHONE ENCOUNTER
Is this about Thoracic Aortic Aneurysm   WHAT YOU NEED TO KNOW:   What is a thoracic aortic aneurysm? A thoracic aortic aneurysm (TAA) is a bulge in your aorta that occurs when the aorta's walls are weakened. The aorta is a large blood vessel that extends from your heart down the center of your chest and into your abdomen. If so. We can send to Dr Bozena Jolly.

## 2022-05-25 ENCOUNTER — APPOINTMENT (OUTPATIENT)
Dept: CARDIOLOGY | Age: 67
End: 2022-05-25

## 2022-06-17 DIAGNOSIS — E11.9 TYPE 2 DIABETES MELLITUS WITHOUT COMPLICATION, WITHOUT LONG-TERM CURRENT USE OF INSULIN (HCC): ICD-10-CM

## 2022-06-20 DIAGNOSIS — E11.9 TYPE 2 DIABETES MELLITUS WITHOUT COMPLICATION, WITHOUT LONG-TERM CURRENT USE OF INSULIN (HCC): ICD-10-CM

## 2022-06-20 RX ORDER — SEMAGLUTIDE 1.34 MG/ML
INJECTION, SOLUTION SUBCUTANEOUS
Qty: 4.5 ML | Refills: 3 | Status: SHIPPED | OUTPATIENT
Start: 2022-06-20

## 2022-06-22 RX ORDER — SEMAGLUTIDE 1.34 MG/ML
INJECTION, SOLUTION SUBCUTANEOUS
Qty: 4.5 ML | Refills: 3 | Status: SHIPPED | OUTPATIENT
Start: 2022-06-22

## 2022-06-27 NOTE — TELEPHONE ENCOUNTER
LUISA for patient that his medication was refilled but he needs to schedule his physical with DR. Mota.

## 2022-07-09 ENCOUNTER — TELEPHONE ENCOUNTER (OUTPATIENT)
Dept: URBAN - METROPOLITAN AREA CLINIC 121 | Facility: CLINIC | Age: 67
End: 2022-07-09

## 2022-07-10 ENCOUNTER — TELEPHONE ENCOUNTER (OUTPATIENT)
Dept: URBAN - METROPOLITAN AREA CLINIC 121 | Facility: CLINIC | Age: 67
End: 2022-07-10

## 2022-07-10 RX ORDER — SEMAGLUTIDE 1.34 MG/ML
ONCE A WEEK INJECTION, SOLUTION SUBCUTANEOUS
Refills: 0 | Status: ACTIVE | COMMUNITY
Start: 2021-12-16

## 2022-07-10 RX ORDER — AMLODIPINE BESYLATE 5 MG/1
TABLET ORAL ONCE A DAY
Refills: 0 | Status: ACTIVE | COMMUNITY
Start: 2021-12-16

## 2022-08-19 DIAGNOSIS — E78.5 DYSLIPIDEMIA, GOAL LDL BELOW 100: ICD-10-CM

## 2022-08-24 NOTE — TELEPHONE ENCOUNTER
Cholesterol Medication Protocol Failed 08/19/2022 04:46 PM   Protocol Details  ALT < 80    ALT resulted within past year    Lipid panel within past 12 months    Appointment within past 12 or next 3 months     LOV 8/2/21 (acute), 5/19/21 (diabetes/ HTN). No upcoming appointment scheduled. Fails protocol. Routed to front staff for appointment scheduling. Please route back to triage once patient has scheduled an appointment. Please ask patient whether they have enough medication to last until their scheduled appointment or if a short-term refill needs to be issued before they are seen.

## 2022-08-31 NOTE — TELEPHONE ENCOUNTER
Pt called back and he has moved to [place] and needs to remove Dr. Candace Jimenez as PCP and remove from Diabetic and Hypertension. No need to fill medication. Form completed and sent to Amanda for review.

## 2022-08-31 NOTE — TELEPHONE ENCOUNTER
LM for pt to call back to schedule an appt/ Physical and needs labs in order to get her medication refilled   (ask if he has enough meds once appt has been scheduled)

## 2022-09-02 ENCOUNTER — OFFICE VISIT (OUTPATIENT)
Dept: FAMILY MEDICINE CLINIC | Facility: CLINIC | Age: 67
End: 2022-09-02
Payer: MEDICARE

## 2022-09-02 VITALS
OXYGEN SATURATION: 98 % | TEMPERATURE: 98 F | SYSTOLIC BLOOD PRESSURE: 130 MMHG | HEIGHT: 71 IN | RESPIRATION RATE: 18 BRPM | DIASTOLIC BLOOD PRESSURE: 78 MMHG | HEART RATE: 87 BPM | WEIGHT: 255 LBS | BODY MASS INDEX: 35.7 KG/M2

## 2022-09-02 DIAGNOSIS — Z02.9 ENCOUNTERS FOR ADMINISTRATIVE PURPOSES: Primary | ICD-10-CM

## 2022-09-02 RX ORDER — ATORVASTATIN CALCIUM 20 MG/1
TABLET, FILM COATED ORAL
Qty: 90 TABLET | Refills: 1 | OUTPATIENT
Start: 2022-09-02

## 2023-09-17 DIAGNOSIS — I10 HYPERTENSION GOAL BP (BLOOD PRESSURE) < 130/80: ICD-10-CM

## 2023-09-21 DIAGNOSIS — I10 HYPERTENSION GOAL BP (BLOOD PRESSURE) < 130/80: ICD-10-CM

## 2023-09-22 RX ORDER — VALSARTAN AND HYDROCHLOROTHIAZIDE 320; 25 MG/1; MG/1
1 TABLET, FILM COATED ORAL DAILY
Qty: 90 TABLET | Refills: 3 | Status: SHIPPED | OUTPATIENT
Start: 2023-09-22

## 2023-09-22 NOTE — TELEPHONE ENCOUNTER
Requested Prescriptions     Pending Prescriptions Disp Refills    VALSARTAN-HYDROCHLOROTHIAZIDE 320-25 MG Oral Tab [Pharmacy Med Name: VALSARTAN/HCTZ 320MG/25MG TABLETS] 90 tablet 3     Sig: TAKE 1 TABLET BY MOUTH EVERY DAY     LOV 08/02/21    Patient was asked to follow-up in: Follow-up not documented in note    Appointment scheduled: Visit date not found     Medication refilled per protocol. Hypertension Medications Protocol Failed   Protocol Details CMP or BMP in past 12 months    Appointment in past 6 or next 3 months          Routed to  to schedule appt.   Routed to Julia Gonzalez MD for review

## 2023-09-27 RX ORDER — VALSARTAN AND HYDROCHLOROTHIAZIDE 320; 25 MG/1; MG/1
TABLET, FILM COATED ORAL
Qty: 90 TABLET | Refills: 3 | OUTPATIENT
Start: 2023-09-27

## 2024-12-30 DIAGNOSIS — I10 HYPERTENSION GOAL BP (BLOOD PRESSURE) < 130/80: ICD-10-CM

## 2025-01-04 RX ORDER — VALSARTAN AND HYDROCHLOROTHIAZIDE 320; 25 MG/1; MG/1
1 TABLET, FILM COATED ORAL DAILY
Qty: 90 TABLET | Refills: 0 | Status: SHIPPED | OUTPATIENT
Start: 2025-01-04

## 2025-01-04 NOTE — TELEPHONE ENCOUNTER
Front Office: Patient overdue for follow-up. Please schedule patient for OV before further refills will be given.    Requested Prescriptions     Signed Prescriptions Disp Refills    Valsartan-hydroCHLOROthiazide 320-25 MG Oral Tab 90 tablet 0     Sig: TAKE 1 TABLET BY MOUTH DAILY     Authorizing Provider: PRAFUL RODRIGUEZ     Ordering User: JANELLE MONACO      Refilled per protocol/OV notes

## (undated) DIAGNOSIS — E78.5 DYSLIPIDEMIA, GOAL LDL BELOW 100: ICD-10-CM

## (undated) DIAGNOSIS — I10 HYPERTENSION GOAL BP (BLOOD PRESSURE) < 130/80: ICD-10-CM

## (undated) NOTE — MR AVS SNAPSHOT
[facility]  [place],  [address] [place] [address] [phone]               Thank you for choosing us for your health care visit with Kylah Duncan MD.  We are glad to serve you and happy to provide you with this summa Take 1 tablet (25 mg total) by mouth daily. Commonly known as:  TOPROL XL           MULTIVITAMIN OR   Take  by mouth.            PRILOSEC 20 MG Cpdr   Generic drug:  omeprazole   Take 20 mg by mouth every morning before breakfast.           Valsartan-Hydr Flores.tn